# Patient Record
Sex: MALE | Race: WHITE | HISPANIC OR LATINO | URBAN - METROPOLITAN AREA
[De-identification: names, ages, dates, MRNs, and addresses within clinical notes are randomized per-mention and may not be internally consistent; named-entity substitution may affect disease eponyms.]

---

## 2018-03-26 ENCOUNTER — EMERGENCY (EMERGENCY)
Facility: HOSPITAL | Age: 23
LOS: 1 days | Discharge: HOME | End: 2018-03-26
Attending: EMERGENCY MEDICINE
Payer: COMMERCIAL

## 2018-03-26 VITALS
HEIGHT: 73 IN | TEMPERATURE: 98 F | DIASTOLIC BLOOD PRESSURE: 78 MMHG | RESPIRATION RATE: 17 BRPM | OXYGEN SATURATION: 100 % | SYSTOLIC BLOOD PRESSURE: 134 MMHG | WEIGHT: 169.98 LBS | HEART RATE: 77 BPM

## 2018-03-26 VITALS
SYSTOLIC BLOOD PRESSURE: 129 MMHG | DIASTOLIC BLOOD PRESSURE: 77 MMHG | OXYGEN SATURATION: 99 % | RESPIRATION RATE: 18 BRPM | TEMPERATURE: 99 F

## 2018-03-26 DIAGNOSIS — F43.23 ADJUSTMENT DISORDER WITH MIXED ANXIETY AND DEPRESSED MOOD: ICD-10-CM

## 2018-03-26 DIAGNOSIS — R69 ILLNESS, UNSPECIFIED: ICD-10-CM

## 2018-03-26 LAB
ANION GAP SERPL CALC-SCNC: 7 MMOL/L — SIGNIFICANT CHANGE UP (ref 5–17)
APAP SERPL-MCNC: <2 UG/ML — LOW (ref 10–30)
APPEARANCE UR: CLEAR — SIGNIFICANT CHANGE UP
BASOPHILS # BLD AUTO: 0.1 K/UL — SIGNIFICANT CHANGE UP (ref 0–0.2)
BASOPHILS NFR BLD AUTO: 1 % — SIGNIFICANT CHANGE UP (ref 0–2)
BILIRUB UR-MCNC: NEGATIVE — SIGNIFICANT CHANGE UP
BUN SERPL-MCNC: 7 MG/DL — SIGNIFICANT CHANGE UP (ref 7–18)
CALCIUM SERPL-MCNC: 8.8 MG/DL — SIGNIFICANT CHANGE UP (ref 8.4–10.5)
CHLORIDE SERPL-SCNC: 105 MMOL/L — SIGNIFICANT CHANGE UP (ref 96–108)
CO2 SERPL-SCNC: 26 MMOL/L — SIGNIFICANT CHANGE UP (ref 22–31)
COLOR SPEC: YELLOW — SIGNIFICANT CHANGE UP
CREAT SERPL-MCNC: 0.71 MG/DL — SIGNIFICANT CHANGE UP (ref 0.5–1.3)
DIFF PNL FLD: NEGATIVE — SIGNIFICANT CHANGE UP
EOSINOPHIL # BLD AUTO: 0 K/UL — SIGNIFICANT CHANGE UP (ref 0–0.5)
EOSINOPHIL NFR BLD AUTO: 0.5 % — SIGNIFICANT CHANGE UP (ref 0–6)
GLUCOSE SERPL-MCNC: 88 MG/DL — SIGNIFICANT CHANGE UP (ref 70–99)
GLUCOSE UR QL: NEGATIVE — SIGNIFICANT CHANGE UP
HCT VFR BLD CALC: 41 % — SIGNIFICANT CHANGE UP (ref 39–50)
HGB BLD-MCNC: 13.8 G/DL — SIGNIFICANT CHANGE UP (ref 13–17)
KETONES UR-MCNC: ABNORMAL
LEUKOCYTE ESTERASE UR-ACNC: NEGATIVE — SIGNIFICANT CHANGE UP
LYMPHOCYTES # BLD AUTO: 2.3 K/UL — SIGNIFICANT CHANGE UP (ref 1–3.3)
LYMPHOCYTES # BLD AUTO: 43.7 % — SIGNIFICANT CHANGE UP (ref 13–44)
MCHC RBC-ENTMCNC: 30.7 PG — SIGNIFICANT CHANGE UP (ref 27–34)
MCHC RBC-ENTMCNC: 33.6 GM/DL — SIGNIFICANT CHANGE UP (ref 32–36)
MCV RBC AUTO: 91.4 FL — SIGNIFICANT CHANGE UP (ref 80–100)
MONOCYTES # BLD AUTO: 0.3 K/UL — SIGNIFICANT CHANGE UP (ref 0–0.9)
MONOCYTES NFR BLD AUTO: 5.9 % — SIGNIFICANT CHANGE UP (ref 2–14)
NEUTROPHILS # BLD AUTO: 2.6 K/UL — SIGNIFICANT CHANGE UP (ref 1.8–7.4)
NEUTROPHILS NFR BLD AUTO: 48.9 % — SIGNIFICANT CHANGE UP (ref 43–77)
NITRITE UR-MCNC: NEGATIVE — SIGNIFICANT CHANGE UP
PCP SPEC-MCNC: SIGNIFICANT CHANGE UP
PH UR: 8 — SIGNIFICANT CHANGE UP (ref 5–8)
PLATELET # BLD AUTO: 243 K/UL — SIGNIFICANT CHANGE UP (ref 150–400)
POTASSIUM SERPL-MCNC: 3.8 MMOL/L — SIGNIFICANT CHANGE UP (ref 3.5–5.3)
POTASSIUM SERPL-SCNC: 3.8 MMOL/L — SIGNIFICANT CHANGE UP (ref 3.5–5.3)
PROT UR-MCNC: NEGATIVE — SIGNIFICANT CHANGE UP
RBC # BLD: 4.49 M/UL — SIGNIFICANT CHANGE UP (ref 4.2–5.8)
RBC # FLD: 12.2 % — SIGNIFICANT CHANGE UP (ref 10.3–14.5)
SALICYLATES SERPL-MCNC: <1.7 MG/DL — LOW (ref 2.8–20)
SODIUM SERPL-SCNC: 138 MMOL/L — SIGNIFICANT CHANGE UP (ref 135–145)
SP GR SPEC: 1.01 — SIGNIFICANT CHANGE UP (ref 1.01–1.02)
UROBILINOGEN FLD QL: 1
WBC # BLD: 5.4 K/UL — SIGNIFICANT CHANGE UP (ref 3.8–10.5)
WBC # FLD AUTO: 5.4 K/UL — SIGNIFICANT CHANGE UP (ref 3.8–10.5)

## 2018-03-26 PROCEDURE — 93005 ELECTROCARDIOGRAM TRACING: CPT

## 2018-03-26 PROCEDURE — 80048 BASIC METABOLIC PNL TOTAL CA: CPT

## 2018-03-26 PROCEDURE — 80307 DRUG TEST PRSMV CHEM ANLYZR: CPT

## 2018-03-26 PROCEDURE — 85027 COMPLETE CBC AUTOMATED: CPT

## 2018-03-26 PROCEDURE — 99285 EMERGENCY DEPT VISIT HI MDM: CPT

## 2018-03-26 PROCEDURE — 99285 EMERGENCY DEPT VISIT HI MDM: CPT | Mod: 25

## 2018-03-26 PROCEDURE — 81003 URINALYSIS AUTO W/O SCOPE: CPT

## 2018-03-26 PROCEDURE — 90792 PSYCH DIAG EVAL W/MED SRVCS: CPT | Mod: GT

## 2018-03-26 NOTE — ED BEHAVIORAL HEALTH ASSESSMENT NOTE - RISK ASSESSMENT
Patient has no h/o suicide attempts, no self injury, no legal hx, no violence, pt currently not suicidal with no ideation, intent or plan.  Patient reports no AVH.  Patient. would benefit from therapy to learn coping skills for his sad mood and grief.

## 2018-03-26 NOTE — ED ADULT TRIAGE NOTE - CHIEF COMPLAINT QUOTE
States " I feel very depressed due to family problems since 2 weeks" , had suicidal thoughts in the past, but has no plan.

## 2018-03-26 NOTE — ED BEHAVIORAL HEALTH ASSESSMENT NOTE - DESCRIPTION
unremarkable none Patient. 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

## 2018-03-26 NOTE — ED PROVIDER NOTE - MEDICAL DECISION MAKING DETAILS
23 y/o M pt presents with depression s/p breakup with girlfriend. Will consult psychiatry, however do not suspect pt will require inpatient admission given protective factors and fleeting thoughts. Will await psychiatric consultation.

## 2018-03-26 NOTE — ED ADULT NURSE NOTE - OBJECTIVE STATEMENT
C/o since breaking up with girlfriend insomina, poor po intake and fleeting thoughts of hurting self.

## 2018-03-26 NOTE — ED PROVIDER NOTE - CARE PLAN
Principal Discharge DX:	Adjustment disorder with mixed anxiety and depressed mood  Secondary Diagnosis:	Deferred condition on axis II

## 2018-03-26 NOTE — ED PROVIDER NOTE - OBJECTIVE STATEMENT
23 y/o M pt with no PMHx and no PSHx presents to ED c/o depression and no PO intake x48 hours. Pt reports feeling sad and having poor sleep for several weeks following a breakup with his girlfriend. Pt also notes having fleeting thoughts of hurting himself, however those thoughts are not sustained and pt has not taken any actions to hurt himself. Pt's mother is at bedside in ED and confirms pt's sx's/story. Pt denies any other complaints. NKDA.

## 2018-03-26 NOTE — ED BEHAVIORAL HEALTH ASSESSMENT NOTE - SUMMARY
Patient is a 23 y/o  male with no past psych hx  or hospitalizations, with no hx of substance abuse aside from frequent marijuana use, no arrests, no legal hx, no suicide attempts, no self injury bib mom after he told her that he had suicidal thoughts.    Patient reports he broke up with GF a few days ago after a two week separation.  He thought they were just taking a break and he just  found out that she wanted to break up with him.  He was very upset.  They had gone out for 8-9 months after they met online. Patient. said he was not sure why she wanted to break up but said initially they had gotten into a fight.  This was patients  first serious relationship.  Patient quit his job after an angry outburst in January.

## 2018-03-26 NOTE — ED BEHAVIORAL HEALTH ASSESSMENT NOTE - HPI (INCLUDE ILLNESS QUALITY, SEVERITY, DURATION, TIMING, CONTEXT, MODIFYING FACTORS, ASSOCIATED SIGNS AND SYMPTOMS)
Patient is a 23 y/o  male with no past psych hx  or hospitalizations, with no hx of substance abuse aside from frequent marijuana use, no arrests, no legal hx, no suicide attempts, no self injury bib mom after he told her that he had suicidal thoughts.    Patient reports he broke up with GF a few days ago after a two week separation.  He thought they were just taking a break and he just  found out that she wanted to break up with him.  He was very upset.  They had gone out for 8-9 months after they met online. Patient. said he was not sure why she wanted to break up but said initially they had gotten into a fight.  This was patients  first serious relationship.  Patient quit his job after an angry outburst in January.  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  Patient. is sad and feels hopeless at times.  He knows he has to get over this. Patient. reports  he has had suicidal thoughts with no intent or plan.  Patient currently is not suicidal.  Patient feels he would be willing to talk to a therapist.  Patient. denies any suicide attempts in past or self injury.  Patient denies any abuse hx, or trauma hx.  Patient has no medical complaints and is not on any medication. Patient is a 23 y/o  male with no past psych hx  or hospitalizations, with no hx of substance abuse aside from frequent marijuana use, no arrests, no legal hx, no suicide attempts, no self injury bib mom after he told her that he had suicidal thoughts.    Patient reports he broke up with GF a few days ago after a two week separation.  He thought they were just taking a break and he just  found out that she wanted to break up with him.  He was very upset.  They had gone out for 8-9 months after they met online. Patient. said he was not sure why she wanted to break up but said initially they had gotten into a fight.  This was patients  first serious relationship.  Patient quit his job after an angry outburst in January.  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  Patient. is sad and feels hopeless at times.  He knows he has to get over this. Patient. reports  he has had suicidal thoughts with no intent or plan.  Patient currently is not suicidal.  Patient feels he would be willing to talk to a therapist.  Patient. denies any suicide attempts in past or self injury.  Patient denies any abuse hx, or trauma hx.  Patient has no medical complaints and is not on any medication.    Third party collateral: [facility] spoke with Alicia Johnson pts mother who reports pt is a 22 year old male, currently unemployed, non-care taker, single domiciled with his mother, sister and grandparents with no psychiatric hx or past hospitalizations. Pt did see a therapist briefly when he was 6 y/o after parent divorce. Pt has no hx of SA and has been expressing passive SI with no plan or intent for the past few weeks. Pt has no history of violence, legal hx, access to weapons, or hx of trauma. Pt utilizes marijuana daily and mother denies any other drug or alcohol use. Recent stressors are pt leaving his job in January as he “lost his temper” and recent break up with his girlfriend. Pt was BIB by his mother for depression and making passive SI statements. Pts mother reports that over the two weeks pt has been presenting as depressed, eating less (lost weight), having difficulty sleeping, and becoming more agitated. Early Wednesday morning, pt called his mother into his room around 3AM and began crying about his recent break up, an  that an ex gf had without telling him last year, and resentment that he feels towards his father for leaving him for a year in his childhood. Also, pts ex gf texted pts mother and reported pt was making passive SI statements. Pt also made passive statements to mom yesterday. As a result, pts mother brought pt to the ER. Pts mother agrees that pt would benefit from outpatient therapy and feels pt is safe going home.

## 2018-03-27 PROBLEM — Z00.00 ENCOUNTER FOR PREVENTIVE HEALTH EXAMINATION: Status: ACTIVE | Noted: 2018-03-27

## 2021-09-15 ENCOUNTER — FORM ENCOUNTER (OUTPATIENT)
Age: 26
End: 2021-09-15

## 2021-09-15 ENCOUNTER — NON-APPOINTMENT (OUTPATIENT)
Age: 26
End: 2021-09-15

## 2021-09-16 ENCOUNTER — LABORATORY RESULT (OUTPATIENT)
Age: 26
End: 2021-09-16

## 2021-09-16 ENCOUNTER — APPOINTMENT (OUTPATIENT)
Dept: INFECTIOUS DISEASE | Facility: CLINIC | Age: 26
End: 2021-09-16
Payer: COMMERCIAL

## 2021-09-16 VITALS
WEIGHT: 170 LBS | RESPIRATION RATE: 16 BRPM | SYSTOLIC BLOOD PRESSURE: 117 MMHG | HEIGHT: 72 IN | OXYGEN SATURATION: 99 % | HEART RATE: 65 BPM | TEMPERATURE: 98.5 F | DIASTOLIC BLOOD PRESSURE: 74 MMHG | BODY MASS INDEX: 23.03 KG/M2

## 2021-09-16 PROCEDURE — 96372 THER/PROPH/DIAG INJ SC/IM: CPT

## 2021-09-16 PROCEDURE — 99203 OFFICE O/P NEW LOW 30 MIN: CPT

## 2021-09-16 PROCEDURE — 99203 OFFICE O/P NEW LOW 30 MIN: CPT | Mod: 25

## 2021-09-16 RX ORDER — PENICILLIN G BENZATHINE 2400000 [IU]/4ML
2400000 INJECTION, SUSPENSION INTRAMUSCULAR
Qty: 1 | Refills: 0 | Status: COMPLETED | OUTPATIENT
Start: 2021-09-16

## 2021-09-16 RX ADMIN — PENICILLIN G BENZATHINE 0 UNIT/4ML: 2400000 INJECTION, SUSPENSION INTRAMUSCULAR at 00:00
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
## 2021-09-17 LAB
25(OH)D3 SERPL-MCNC: 25.1 NG/ML
ALBUMIN SERPL ELPH-MCNC: 4.9 G/DL
ALP BLD-CCNC: 70 U/L
ALT SERPL-CCNC: 12 U/L
ANION GAP SERPL CALC-SCNC: 11 MMOL/L
APPEARANCE: CLEAR
AST SERPL-CCNC: 18 U/L
BACTERIA: NEGATIVE
BASOPHILS # BLD AUTO: 0.02 K/UL
BASOPHILS NFR BLD AUTO: 0.5 %
BILIRUB SERPL-MCNC: 1 MG/DL
BILIRUBIN URINE: NEGATIVE
BLOOD URINE: NEGATIVE
BUN SERPL-MCNC: 11 MG/DL
C TRACH RRNA SPEC QL NAA+PROBE: NOT DETECTED
CALCIUM SERPL-MCNC: 9.6 MG/DL
CD3 CELLS # BLD: 1595 /UL
CD3 CELLS NFR BLD: 85 %
CD3+CD4+ CELLS # BLD: 425 /UL
CD3+CD4+ CELLS NFR BLD: 23 %
CD3+CD4+ CELLS/CD3+CD8+ CLL SPEC: 0.39 RATIO
CD3+CD8+ CELLS # SPEC: 1077 /UL
CD3+CD8+ CELLS NFR BLD: 58 %
CHLORIDE SERPL-SCNC: 102 MMOL/L
CHOLEST SERPL-MCNC: 123 MG/DL
CO2 SERPL-SCNC: 26 MMOL/L
COLOR: YELLOW
CREAT SERPL-MCNC: 0.94 MG/DL
EOSINOPHIL # BLD AUTO: 0.06 K/UL
EOSINOPHIL NFR BLD AUTO: 1.5 %
GLUCOSE QUALITATIVE U: NEGATIVE
GLUCOSE SERPL-MCNC: 93 MG/DL
HBV SURFACE AB SER QL: REACTIVE
HBV SURFACE AG SER QL: NONREACTIVE
HCT VFR BLD CALC: 44 %
HCV AB SER QL: NONREACTIVE
HCV S/CO RATIO: 0.24 S/CO
HDLC SERPL-MCNC: 38 MG/DL
HEPATITIS A IGG ANTIBODY: REACTIVE
HGB BLD-MCNC: 14 G/DL
HIV1 RNA # SERPL NAA+PROBE: ABNORMAL
HIV1 RNA # SERPL NAA+PROBE: NORMAL
HIV1+2 AB SPEC QL IA.RAPID: REACTIVE
HYALINE CASTS: 0 /LPF
IMM GRANULOCYTES NFR BLD AUTO: 0.2 %
KETONES URINE: NEGATIVE
LDLC SERPL CALC-MCNC: 75 MG/DL
LEUKOCYTE ESTERASE URINE: NEGATIVE
LYMPHOCYTES # BLD AUTO: 2.16 K/UL
LYMPHOCYTES NFR BLD AUTO: 52.4 %
MAN DIFF?: NORMAL
MCHC RBC-ENTMCNC: 30.3 PG
MCHC RBC-ENTMCNC: 31.8 GM/DL
MCV RBC AUTO: 95.2 FL
MICROSCOPIC-UA: NORMAL
MONOCYTES # BLD AUTO: 0.45 K/UL
MONOCYTES NFR BLD AUTO: 10.9 %
N GONORRHOEA RRNA SPEC QL NAA+PROBE: NOT DETECTED
NEUTROPHILS # BLD AUTO: 1.42 K/UL
NEUTROPHILS NFR BLD AUTO: 34.5 %
NITRITE URINE: NEGATIVE
NONHDLC SERPL-MCNC: 85 MG/DL
PH URINE: 7.5
PLATELET # BLD AUTO: 263 K/UL
POTASSIUM SERPL-SCNC: 4.7 MMOL/L
PROT SERPL-MCNC: 8.4 G/DL
PROTEIN URINE: NORMAL
RBC # BLD: 4.62 M/UL
RBC # FLD: 12.9 %
RED BLOOD CELLS URINE: 5 /HPF
SODIUM SERPL-SCNC: 137 MMOL/L
SOURCE AMPLIFICATION: NORMAL
SPECIFIC GRAVITY URINE: 1.03
SQUAMOUS EPITHELIAL CELLS: 2 /HPF
T PALLIDUM AB SER QL IA: POSITIVE
TRIGL SERPL-MCNC: 52 MG/DL
UROBILINOGEN URINE: ABNORMAL
VIRAL LOAD INTERP: NORMAL
VIRAL LOAD LOG: 3.46
WBC # FLD AUTO: 4.12 K/UL
WHITE BLOOD CELLS URINE: 5 /HPF

## 2021-09-21 ENCOUNTER — NON-APPOINTMENT (OUTPATIENT)
Age: 26
End: 2021-09-21

## 2021-09-21 LAB
HBV DNA # SERPL NAA+PROBE: NOT DETECTED IU/ML
HEPB DNA PCR LOG: NOT DETECTED LOG10IU/ML

## 2021-09-22 ENCOUNTER — APPOINTMENT (OUTPATIENT)
Dept: INFECTIOUS DISEASE | Facility: CLINIC | Age: 26
End: 2021-09-22

## 2021-09-23 ENCOUNTER — NON-APPOINTMENT (OUTPATIENT)
Age: 26
End: 2021-09-23

## 2021-09-24 ENCOUNTER — APPOINTMENT (OUTPATIENT)
Dept: INFECTIOUS DISEASE | Facility: CLINIC | Age: 26
End: 2021-09-24
Payer: COMMERCIAL

## 2021-09-24 DIAGNOSIS — A53.9 SYPHILIS, UNSPECIFIED: ICD-10-CM

## 2021-09-24 PROCEDURE — 96372 THER/PROPH/DIAG INJ SC/IM: CPT

## 2021-09-24 RX ORDER — PENICILLIN G BENZATHINE 2400000 [IU]/4ML
2400000 INJECTION, SUSPENSION INTRAMUSCULAR
Qty: 1 | Refills: 0 | Status: COMPLETED | OUTPATIENT
Start: 2021-09-24

## 2021-09-28 LAB
BICTEGRAVIR RESISTANCE: NORMAL
DOLUTEGRAVIR RESISTANCE: NORMAL
ELVITEGRAVIR RESISTANCE: NORMAL
HIV RT GENE MUT DET ISLT: NORMAL
HIV-1 GENOTYPE DRUG RESISTANCE: DETECTED
HIV-1 INTEGRASE GENOTYPE: NORMAL
RALTEGRAVIR RESISTANCE: NORMAL
VIRAL LOAD DATE: NORMAL

## 2021-09-30 ENCOUNTER — NON-APPOINTMENT (OUTPATIENT)
Age: 26
End: 2021-09-30

## 2021-10-01 ENCOUNTER — APPOINTMENT (OUTPATIENT)
Dept: INFECTIOUS DISEASE | Facility: CLINIC | Age: 26
End: 2021-10-01
Payer: COMMERCIAL

## 2021-10-01 DIAGNOSIS — T80.89XA OTHER COMPLICATIONS FOLLOWING INFUSION, TRANSFUSION AND THERAPEUTIC INJECTION, INITIAL ENCOUNTER: ICD-10-CM

## 2021-10-01 DIAGNOSIS — R52 OTHER COMPLICATIONS FOLLOWING INFUSION, TRANSFUSION AND THERAPEUTIC INJECTION, INITIAL ENCOUNTER: ICD-10-CM

## 2021-10-01 PROCEDURE — 96372 THER/PROPH/DIAG INJ SC/IM: CPT

## 2021-10-01 RX ORDER — IBUPROFEN 200 MG/1
200 TABLET ORAL
Qty: 0 | Refills: 0 | Status: COMPLETED | OUTPATIENT
Start: 2021-10-01

## 2021-10-01 RX ORDER — PENICILLIN G BENZATHINE 2400000 [IU]/4ML
2400000 INJECTION, SUSPENSION INTRAMUSCULAR
Qty: 1 | Refills: 0 | Status: COMPLETED | OUTPATIENT
Start: 2021-09-24

## 2021-10-01 RX ADMIN — IBUPROFEN 2 MG: 200 TABLET, FILM COATED ORAL at 00:00

## 2021-10-11 ENCOUNTER — NON-APPOINTMENT (OUTPATIENT)
Age: 26
End: 2021-10-11

## 2021-10-15 ENCOUNTER — RX RENEWAL (OUTPATIENT)
Age: 26
End: 2021-10-15

## 2021-10-18 ENCOUNTER — NON-APPOINTMENT (OUTPATIENT)
Age: 26
End: 2021-10-18

## 2021-10-19 ENCOUNTER — APPOINTMENT (OUTPATIENT)
Dept: INFECTIOUS DISEASE | Facility: CLINIC | Age: 26
End: 2021-10-19

## 2021-10-20 ENCOUNTER — APPOINTMENT (OUTPATIENT)
Dept: INFECTIOUS DISEASE | Facility: CLINIC | Age: 26
End: 2021-10-20
Payer: COMMERCIAL

## 2021-10-20 VITALS
OXYGEN SATURATION: 98 % | HEART RATE: 63 BPM | SYSTOLIC BLOOD PRESSURE: 105 MMHG | TEMPERATURE: 97.5 F | WEIGHT: 170 LBS | BODY MASS INDEX: 23.03 KG/M2 | HEIGHT: 72 IN | DIASTOLIC BLOOD PRESSURE: 74 MMHG

## 2021-10-20 DIAGNOSIS — A54.9 GONOCOCCAL INFECTION, UNSPECIFIED: ICD-10-CM

## 2021-10-20 LAB
ALBUMIN SERPL ELPH-MCNC: 4.6 G/DL
ALP BLD-CCNC: 63 U/L
ALT SERPL-CCNC: 17 U/L
ANION GAP SERPL CALC-SCNC: 10 MMOL/L
AST SERPL-CCNC: 19 U/L
BILIRUB SERPL-MCNC: 0.6 MG/DL
BUN SERPL-MCNC: 12 MG/DL
CALCIUM SERPL-MCNC: 9.5 MG/DL
CD3 CELLS # BLD: 1807 /UL
CD3 CELLS NFR BLD: 85 %
CD3+CD4+ CELLS # BLD: 591 /UL
CD3+CD4+ CELLS NFR BLD: 28 %
CD3+CD4+ CELLS/CD3+CD8+ CLL SPEC: 0.53 RATIO
CD3+CD8+ CELLS # SPEC: 1124 /UL
CD3+CD8+ CELLS NFR BLD: 53 %
CHLORIDE SERPL-SCNC: 104 MMOL/L
CO2 SERPL-SCNC: 27 MMOL/L
CREAT SERPL-MCNC: 0.99 MG/DL
GLUCOSE SERPL-MCNC: 97 MG/DL
POTASSIUM SERPL-SCNC: 4 MMOL/L
PROT SERPL-MCNC: 7.6 G/DL
SODIUM SERPL-SCNC: 140 MMOL/L

## 2021-10-20 PROCEDURE — 99214 OFFICE O/P EST MOD 30 MIN: CPT
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
## 2021-10-21 ENCOUNTER — NON-APPOINTMENT (OUTPATIENT)
Age: 26
End: 2021-10-21

## 2021-10-22 LAB
BASOPHILS # BLD AUTO: 0.02 K/UL
BASOPHILS NFR BLD AUTO: 0.5 %
EOSINOPHIL # BLD AUTO: 0.07 K/UL
EOSINOPHIL NFR BLD AUTO: 1.8 %
HCT VFR BLD CALC: 41.6 %
HGB BLD-MCNC: 13.2 G/DL
HIV1 RNA # SERPL NAA+PROBE: ABNORMAL
HIV1 RNA # SERPL NAA+PROBE: ABNORMAL COPIES/ML
IMM GRANULOCYTES NFR BLD AUTO: 0 %
LYMPHOCYTES # BLD AUTO: 2.05 K/UL
LYMPHOCYTES NFR BLD AUTO: 54.1 %
MAN DIFF?: NORMAL
MCHC RBC-ENTMCNC: 29.9 PG
MCHC RBC-ENTMCNC: 31.7 GM/DL
MCV RBC AUTO: 94.1 FL
MONOCYTES # BLD AUTO: 0.4 K/UL
MONOCYTES NFR BLD AUTO: 10.6 %
NEUTROPHILS # BLD AUTO: 1.25 K/UL
NEUTROPHILS NFR BLD AUTO: 33 %
PLATELET # BLD AUTO: 285 K/UL
RBC # BLD: 4.42 M/UL
RBC # FLD: 13.1 %
VIRAL LOAD INTERP: NORMAL
VIRAL LOAD LOG: ABNORMAL LG COP/ML
WBC # FLD AUTO: 3.79 K/UL

## 2021-11-17 ENCOUNTER — NON-APPOINTMENT (OUTPATIENT)
Age: 26
End: 2021-11-17

## 2021-11-18 ENCOUNTER — APPOINTMENT (OUTPATIENT)
Dept: INFECTIOUS DISEASE | Facility: CLINIC | Age: 26
End: 2021-11-18

## 2021-11-18 ENCOUNTER — NON-APPOINTMENT (OUTPATIENT)
Age: 26
End: 2021-11-18

## 2021-11-19 ENCOUNTER — NON-APPOINTMENT (OUTPATIENT)
Age: 26
End: 2021-11-19

## 2021-12-08 ENCOUNTER — NON-APPOINTMENT (OUTPATIENT)
Age: 26
End: 2021-12-08

## 2021-12-08 ENCOUNTER — FORM ENCOUNTER (OUTPATIENT)
Age: 26
End: 2021-12-08

## 2021-12-09 ENCOUNTER — APPOINTMENT (OUTPATIENT)
Dept: INFECTIOUS DISEASE | Facility: CLINIC | Age: 26
End: 2021-12-09
Payer: COMMERCIAL

## 2021-12-09 VITALS
TEMPERATURE: 97.8 F | OXYGEN SATURATION: 99 % | DIASTOLIC BLOOD PRESSURE: 59 MMHG | SYSTOLIC BLOOD PRESSURE: 104 MMHG | HEART RATE: 69 BPM | HEIGHT: 72 IN | WEIGHT: 168 LBS | BODY MASS INDEX: 22.75 KG/M2

## 2021-12-09 PROCEDURE — 99213 OFFICE O/P EST LOW 20 MIN: CPT
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
## 2021-12-10 LAB
25(OH)D3 SERPL-MCNC: 13.8 NG/ML
ALBUMIN SERPL ELPH-MCNC: 5 G/DL
ALP BLD-CCNC: 71 U/L
ALT SERPL-CCNC: 12 U/L
ANION GAP SERPL CALC-SCNC: 9 MMOL/L
APPEARANCE: CLEAR
AST SERPL-CCNC: 14 U/L
BACTERIA: NEGATIVE
BASOPHILS # BLD AUTO: 0.02 K/UL
BASOPHILS NFR BLD AUTO: 0.5 %
BILIRUB SERPL-MCNC: 2.1 MG/DL
BILIRUBIN URINE: NEGATIVE
BLOOD URINE: NEGATIVE
BUN SERPL-MCNC: 10 MG/DL
C TRACH RRNA SPEC QL NAA+PROBE: NOT DETECTED
CALCIUM SERPL-MCNC: 9.8 MG/DL
CD3 CELLS # BLD: 1728 /UL
CD3 CELLS NFR BLD: 81 %
CD3+CD4+ CELLS # BLD: 625 /UL
CD3+CD4+ CELLS NFR BLD: 29 %
CD3+CD4+ CELLS/CD3+CD8+ CLL SPEC: 0.61 RATIO
CD3+CD8+ CELLS # SPEC: 1029 /UL
CD3+CD8+ CELLS NFR BLD: 48 %
CHLORIDE SERPL-SCNC: 102 MMOL/L
CO2 SERPL-SCNC: 28 MMOL/L
COLOR: YELLOW
CREAT SERPL-MCNC: 1.07 MG/DL
EOSINOPHIL # BLD AUTO: 0.08 K/UL
EOSINOPHIL NFR BLD AUTO: 2 %
GLUCOSE QUALITATIVE U: NEGATIVE
GLUCOSE SERPL-MCNC: 98 MG/DL
HCT VFR BLD CALC: 40.2 %
HGB BLD-MCNC: 13 G/DL
HIV1 RNA # SERPL NAA+PROBE: NORMAL
HIV1 RNA # SERPL NAA+PROBE: NORMAL COPIES/ML
HYALINE CASTS: 2 /LPF
IMM GRANULOCYTES NFR BLD AUTO: 0.3 %
KETONES URINE: NEGATIVE
LEUKOCYTE ESTERASE URINE: NEGATIVE
LYMPHOCYTES # BLD AUTO: 2.07 K/UL
LYMPHOCYTES NFR BLD AUTO: 52.5 %
MAN DIFF?: NORMAL
MCHC RBC-ENTMCNC: 31.4 PG
MCHC RBC-ENTMCNC: 32.3 GM/DL
MCV RBC AUTO: 97.1 FL
MICROSCOPIC-UA: NORMAL
MONOCYTES # BLD AUTO: 0.44 K/UL
MONOCYTES NFR BLD AUTO: 11.2 %
N GONORRHOEA RRNA SPEC QL NAA+PROBE: NOT DETECTED
NEUTROPHILS # BLD AUTO: 1.32 K/UL
NEUTROPHILS NFR BLD AUTO: 33.5 %
NITRITE URINE: NEGATIVE
PH URINE: 6
PLATELET # BLD AUTO: 303 K/UL
POTASSIUM SERPL-SCNC: 3.9 MMOL/L
PROT SERPL-MCNC: 7.9 G/DL
PROTEIN URINE: ABNORMAL
RBC # BLD: 4.14 M/UL
RBC # FLD: 13.2 %
RED BLOOD CELLS URINE: 1 /HPF
SODIUM SERPL-SCNC: 139 MMOL/L
SOURCE AMPLIFICATION: NORMAL
SPECIFIC GRAVITY URINE: 1.03
SQUAMOUS EPITHELIAL CELLS: 1 /HPF
UROBILINOGEN URINE: NORMAL
VIRAL LOAD INTERP: NORMAL
VIRAL LOAD LOG: NORMAL LG COP/ML
WBC # FLD AUTO: 3.94 K/UL
WHITE BLOOD CELLS URINE: 8 /HPF

## 2021-12-16 ENCOUNTER — NON-APPOINTMENT (OUTPATIENT)
Age: 26
End: 2021-12-16

## 2022-01-05 ENCOUNTER — INPATIENT (INPATIENT)
Facility: HOSPITAL | Age: 27
LOS: 1 days | Discharge: HOME | End: 2022-01-07
Attending: STUDENT IN AN ORGANIZED HEALTH CARE EDUCATION/TRAINING PROGRAM | Admitting: STUDENT IN AN ORGANIZED HEALTH CARE EDUCATION/TRAINING PROGRAM
Payer: COMMERCIAL

## 2022-01-05 VITALS
RESPIRATION RATE: 18 BRPM | SYSTOLIC BLOOD PRESSURE: 151 MMHG | TEMPERATURE: 99 F | OXYGEN SATURATION: 98 % | HEIGHT: 73 IN | HEART RATE: 150 BPM | DIASTOLIC BLOOD PRESSURE: 86 MMHG

## 2022-01-05 LAB
HCT VFR BLD CALC: 43 % — SIGNIFICANT CHANGE UP (ref 39–50)
HGB BLD-MCNC: 14.9 G/DL — SIGNIFICANT CHANGE UP (ref 13–17)
IANC: 7.44 K/UL — SIGNIFICANT CHANGE UP (ref 1.5–8.5)
MCHC RBC-ENTMCNC: 31.4 PG — SIGNIFICANT CHANGE UP (ref 27–34)
MCHC RBC-ENTMCNC: 34.7 GM/DL — SIGNIFICANT CHANGE UP (ref 32–36)
MCV RBC AUTO: 90.7 FL — SIGNIFICANT CHANGE UP (ref 80–100)
PLATELET # BLD AUTO: 279 K/UL — SIGNIFICANT CHANGE UP (ref 150–400)
RBC # BLD: 4.74 M/UL — SIGNIFICANT CHANGE UP (ref 4.2–5.8)
RBC # FLD: 11.9 % — SIGNIFICANT CHANGE UP (ref 10.3–14.5)
WBC # BLD: 9.61 K/UL — SIGNIFICANT CHANGE UP (ref 3.8–10.5)
WBC # FLD AUTO: 9.61 K/UL — SIGNIFICANT CHANGE UP (ref 3.8–10.5)

## 2022-01-05 PROCEDURE — 93010 ELECTROCARDIOGRAM REPORT: CPT

## 2022-01-05 PROCEDURE — 71045 X-RAY EXAM CHEST 1 VIEW: CPT | Mod: 26

## 2022-01-05 PROCEDURE — 99285 EMERGENCY DEPT VISIT HI MDM: CPT | Mod: 25

## 2022-01-05 RX ORDER — SODIUM CHLORIDE 9 MG/ML
1000 INJECTION INTRAMUSCULAR; INTRAVENOUS; SUBCUTANEOUS ONCE
Refills: 0 | Status: COMPLETED | OUTPATIENT
Start: 2022-01-05 | End: 2022-01-05

## 2022-01-05 RX ORDER — FAMOTIDINE 10 MG/ML
20 INJECTION INTRAVENOUS ONCE
Refills: 0 | Status: COMPLETED | OUTPATIENT
Start: 2022-01-05 | End: 2022-01-05

## 2022-01-05 RX ORDER — ONDANSETRON 8 MG/1
4 TABLET, FILM COATED ORAL ONCE
Refills: 0 | Status: COMPLETED | OUTPATIENT
Start: 2022-01-05 | End: 2022-01-05

## 2022-01-05 RX ORDER — SODIUM CHLORIDE 9 MG/ML
2000 INJECTION INTRAMUSCULAR; INTRAVENOUS; SUBCUTANEOUS ONCE
Refills: 0 | Status: COMPLETED | OUTPATIENT
Start: 2022-01-05 | End: 2022-01-05

## 2022-01-05 RX ORDER — ACETAMINOPHEN 500 MG
650 TABLET ORAL ONCE
Refills: 0 | Status: COMPLETED | OUTPATIENT
Start: 2022-01-05 | End: 2022-01-05

## 2022-01-05 RX ADMIN — Medication 650 MILLIGRAM(S): at 23:58

## 2022-01-05 RX ADMIN — SODIUM CHLORIDE 1000 MILLILITER(S): 9 INJECTION INTRAMUSCULAR; INTRAVENOUS; SUBCUTANEOUS at 23:58

## 2022-01-05 RX ADMIN — SODIUM CHLORIDE 2000 MILLILITER(S): 9 INJECTION INTRAMUSCULAR; INTRAVENOUS; SUBCUTANEOUS at 22:17

## 2022-01-05 RX ADMIN — ONDANSETRON 4 MILLIGRAM(S): 8 TABLET, FILM COATED ORAL at 22:17

## 2022-01-05 RX ADMIN — FAMOTIDINE 20 MILLIGRAM(S): 10 INJECTION INTRAVENOUS at 22:17

## 2022-01-05 NOTE — ED PROVIDER NOTE - ATTENDING CONTRIBUTION TO CARE
Patient is a 27 yo M with history of HIV on Biktavy, undetectable VL, does not know last CD4 but states it was good, now here for nausea, vomiting and diarrhea since yesterday. His sister had COVID in the past week. He is not vaccinated. He states he feels short of breath, denies chest pain.     VS noted  Gen. no acute distress, Non toxic   HEENT: EOMI, mmm  Lungs: CTAB/L no C/ W /R   CVS: tachycardic  Abd; Soft non tender, non distended   Ext: no edema  Skin: no rash  Neuro AAOx3 non focal clear speech  a/p: n/v/d - concern for COVID19. Plan for ekg, IVF, labs, CXR, reassess.   - Matthew WALTER

## 2022-01-05 NOTE — ED PROVIDER NOTE - NS ED ROS FT
CONST: no fevers, no chills, no lightheadedness + generalized weakness   HEENT: no vision change, no sore throat  CV: no chest pain, no palpitations  RESP: no cough, no shortness of breath  ABD: + abdominal pain, + nausea/vomiting, + diarrhea  : no dysuria, no hematuria  ENDO: no frequent urination, no unusual thirst  MSK: generalized myalgias   NEURO: no headache, no focal weakness, no loss of sensation  SKIN:  no rash

## 2022-01-05 NOTE — ED PROVIDER NOTE - OBJECTIVE STATEMENT
26M HIV (states last viral load undetectable, "my last labs were all good) on Biktarvy p/w diarrhea, vomiting, malaise. Reports 2 days of persistant n/v/d (NBNB) w/o ability to tolerate po, denies cough or fevers. Has not tested for covid. States sister with similar flu-like symptoms at home. No neck pain.  + palpitations. Unvaccinated.     Noted tachy to 130+ in waiting room.

## 2022-01-05 NOTE — ED PROVIDER NOTE - CLINICAL SUMMARY MEDICAL DECISION MAKING FREE TEXT BOX
26M hx HIV on biktarvy w/ several days GI symptoms, unable to tolerate po. sinus tach, uncomfortable. Plan for labs, fluids, cxr, antiemetics, reassess.

## 2022-01-05 NOTE — ED PROVIDER NOTE - PHYSICAL EXAMINATION
Gen: WDWN, NAD, uncomfortable  HEENT: EOMI, no nasal discharge, mucous membranes moist  CV: sinus tach, +S1/S2, no M/R/G  Resp: CTAB, no W/R/R  GI: Abdomen mild diffuse discomfort w/o rebound/guarding, no masses  MSK: No open wounds, no bruising, no LE edema  Neuro: A&Ox4, following commands, moving all four extremities spontaneously  Psych: appropriate mood, denies AH, VH, SI

## 2022-01-05 NOTE — ED PROVIDER NOTE - PROGRESS NOTE DETAILS
Jose PGY3: covid/flu neg. Suspect likely gastroenteritis. bands of 37%, concern for bacterial infectious etiology. still tachycardiac after 3L, pt endorsing persistent diarrhea. will admit at this time having drawn blood cx, will order stool studies, plan for admission. Ordered abx.

## 2022-01-05 NOTE — ED ADULT NURSE NOTE - OBJECTIVE STATEMENT
Pt. presented to room 5. Pt. A&Ox4 ambulatory self. Pt. c/o diarrhea, nausea and vomit ting x2 days states he has not been able to keep anything down and always going to BR.  Pt. believes he is covid + however has not previously tested positive. Pt. is not vaccinated. Pt. denies SOB, belly pain, lightheadedness and/or dizziness.  Pt. sinus tachycardia on the monitor now 117. Pt. states he does have some CP when he takes a deep breath./ Pt. presented to room 5. Pt. A&Ox4 ambulatory self. Pt. c/o diarrhea, nausea and vomit ting x2 days states he has not been able to keep anything down and always going to BR.  Pt. believes he is covid + however has not previously tested positive. Pt. is not vaccinated. Pt. denies SOB, belly pain, lightheadedness and/or dizziness.  Pt. sinus tachycardia on the monitor now 117. Vitally stable on RA. Pt. states he does have some CP when he takes a deep breath. [id number] labs sent meds per order.

## 2022-01-06 ENCOUNTER — NON-APPOINTMENT (OUTPATIENT)
Age: 27
End: 2022-01-06

## 2022-01-06 DIAGNOSIS — R11.2 NAUSEA WITH VOMITING, UNSPECIFIED: ICD-10-CM

## 2022-01-06 DIAGNOSIS — Z29.9 ENCOUNTER FOR PROPHYLACTIC MEASURES, UNSPECIFIED: ICD-10-CM

## 2022-01-06 DIAGNOSIS — B20 HUMAN IMMUNODEFICIENCY VIRUS [HIV] DISEASE: ICD-10-CM

## 2022-01-06 DIAGNOSIS — K52.9 NONINFECTIVE GASTROENTERITIS AND COLITIS, UNSPECIFIED: ICD-10-CM

## 2022-01-06 DIAGNOSIS — E87.1 HYPO-OSMOLALITY AND HYPONATREMIA: ICD-10-CM

## 2022-01-06 LAB
ALBUMIN SERPL ELPH-MCNC: 3.7 G/DL — SIGNIFICANT CHANGE UP (ref 3.3–5)
ALBUMIN SERPL ELPH-MCNC: 4.5 G/DL — SIGNIFICANT CHANGE UP (ref 3.3–5)
ALP SERPL-CCNC: 50 U/L — SIGNIFICANT CHANGE UP (ref 40–120)
ALP SERPL-CCNC: 69 U/L — SIGNIFICANT CHANGE UP (ref 40–120)
ALT FLD-CCNC: 16 U/L — SIGNIFICANT CHANGE UP (ref 4–41)
ALT FLD-CCNC: 26 U/L — SIGNIFICANT CHANGE UP (ref 4–41)
ANION GAP SERPL CALC-SCNC: 12 MMOL/L — SIGNIFICANT CHANGE UP (ref 7–14)
ANION GAP SERPL CALC-SCNC: 14 MMOL/L — SIGNIFICANT CHANGE UP (ref 7–14)
AST SERPL-CCNC: 14 U/L — SIGNIFICANT CHANGE UP (ref 4–40)
AST SERPL-CCNC: 19 U/L — SIGNIFICANT CHANGE UP (ref 4–40)
BASOPHILS # BLD AUTO: 0 K/UL — SIGNIFICANT CHANGE UP (ref 0–0.2)
BASOPHILS # BLD AUTO: 0 K/UL — SIGNIFICANT CHANGE UP (ref 0–0.2)
BASOPHILS NFR BLD AUTO: 0 % — SIGNIFICANT CHANGE UP (ref 0–2)
BASOPHILS NFR BLD AUTO: 0 % — SIGNIFICANT CHANGE UP (ref 0–2)
BILIRUB SERPL-MCNC: 1.6 MG/DL — HIGH (ref 0.2–1.2)
BILIRUB SERPL-MCNC: 2.3 MG/DL — HIGH (ref 0.2–1.2)
BLOOD GAS VENOUS COMPREHENSIVE RESULT: SIGNIFICANT CHANGE UP
BUN SERPL-MCNC: 14 MG/DL — SIGNIFICANT CHANGE UP (ref 7–23)
BUN SERPL-MCNC: 8 MG/DL — SIGNIFICANT CHANGE UP (ref 7–23)
CALCIUM SERPL-MCNC: 8.8 MG/DL — SIGNIFICANT CHANGE UP (ref 8.4–10.5)
CALCIUM SERPL-MCNC: 9.6 MG/DL — SIGNIFICANT CHANGE UP (ref 8.4–10.5)
CHLORIDE SERPL-SCNC: 91 MMOL/L — LOW (ref 98–107)
CHLORIDE SERPL-SCNC: 97 MMOL/L — LOW (ref 98–107)
CO2 SERPL-SCNC: 22 MMOL/L — SIGNIFICANT CHANGE UP (ref 22–31)
CO2 SERPL-SCNC: 24 MMOL/L — SIGNIFICANT CHANGE UP (ref 22–31)
CREAT SERPL-MCNC: 0.99 MG/DL — SIGNIFICANT CHANGE UP (ref 0.5–1.3)
CREAT SERPL-MCNC: 1.25 MG/DL — SIGNIFICANT CHANGE UP (ref 0.5–1.3)
CULTURE RESULTS: SIGNIFICANT CHANGE UP
EOSINOPHIL # BLD AUTO: 0 K/UL — SIGNIFICANT CHANGE UP (ref 0–0.5)
EOSINOPHIL # BLD AUTO: 0 K/UL — SIGNIFICANT CHANGE UP (ref 0–0.5)
EOSINOPHIL NFR BLD AUTO: 0 % — SIGNIFICANT CHANGE UP (ref 0–6)
EOSINOPHIL NFR BLD AUTO: 0 % — SIGNIFICANT CHANGE UP (ref 0–6)
FLUAV AG NPH QL: SIGNIFICANT CHANGE UP
FLUBV AG NPH QL: SIGNIFICANT CHANGE UP
GLUCOSE SERPL-MCNC: 120 MG/DL — HIGH (ref 70–99)
GLUCOSE SERPL-MCNC: 125 MG/DL — HIGH (ref 70–99)
HCT VFR BLD CALC: 38.5 % — LOW (ref 39–50)
HGB BLD-MCNC: 13.2 G/DL — SIGNIFICANT CHANGE UP (ref 13–17)
IANC: 6.08 K/UL — SIGNIFICANT CHANGE UP (ref 1.5–8.5)
LIDOCAIN IGE QN: 11 U/L — SIGNIFICANT CHANGE UP (ref 7–60)
LYMPHOCYTES # BLD AUTO: 0.52 K/UL — LOW (ref 1–3.3)
LYMPHOCYTES # BLD AUTO: 0.8 K/UL — LOW (ref 1–3.3)
LYMPHOCYTES # BLD AUTO: 5.4 % — LOW (ref 13–44)
LYMPHOCYTES # BLD AUTO: 9.6 % — LOW (ref 13–44)
MAGNESIUM SERPL-MCNC: 1.5 MG/DL — LOW (ref 1.6–2.6)
MAGNESIUM SERPL-MCNC: 1.6 MG/DL — SIGNIFICANT CHANGE UP (ref 1.6–2.6)
MCHC RBC-ENTMCNC: 32.3 PG — SIGNIFICANT CHANGE UP (ref 27–34)
MCHC RBC-ENTMCNC: 34.3 GM/DL — SIGNIFICANT CHANGE UP (ref 32–36)
MCV RBC AUTO: 94.1 FL — SIGNIFICANT CHANGE UP (ref 80–100)
MONOCYTES # BLD AUTO: 0.68 K/UL — SIGNIFICANT CHANGE UP (ref 0–0.9)
MONOCYTES # BLD AUTO: 1.1 K/UL — HIGH (ref 0–0.9)
MONOCYTES NFR BLD AUTO: 13.2 % — SIGNIFICANT CHANGE UP (ref 2–14)
MONOCYTES NFR BLD AUTO: 7.1 % — SIGNIFICANT CHANGE UP (ref 2–14)
NEUTROPHILS # BLD AUTO: 6.12 K/UL — SIGNIFICANT CHANGE UP (ref 1.8–7.4)
NEUTROPHILS # BLD AUTO: 7.03 K/UL — SIGNIFICANT CHANGE UP (ref 1.8–7.4)
NEUTROPHILS NFR BLD AUTO: 35.7 % — LOW (ref 43–77)
NEUTROPHILS NFR BLD AUTO: 50 % — SIGNIFICANT CHANGE UP (ref 43–77)
PHOSPHATE SERPL-MCNC: 1.1 MG/DL — LOW (ref 2.5–4.5)
PHOSPHATE SERPL-MCNC: 1.9 MG/DL — LOW (ref 2.5–4.5)
PLATELET # BLD AUTO: 236 K/UL — SIGNIFICANT CHANGE UP (ref 150–400)
POTASSIUM SERPL-MCNC: 3.4 MMOL/L — LOW (ref 3.5–5.3)
POTASSIUM SERPL-MCNC: 3.5 MMOL/L — SIGNIFICANT CHANGE UP (ref 3.5–5.3)
POTASSIUM SERPL-SCNC: 3.4 MMOL/L — LOW (ref 3.5–5.3)
POTASSIUM SERPL-SCNC: 3.5 MMOL/L — SIGNIFICANT CHANGE UP (ref 3.5–5.3)
PROT SERPL-MCNC: 6.9 G/DL — SIGNIFICANT CHANGE UP (ref 6–8.3)
PROT SERPL-MCNC: 8 G/DL — SIGNIFICANT CHANGE UP (ref 6–8.3)
RBC # BLD: 4.09 M/UL — LOW (ref 4.2–5.8)
RBC # FLD: 11.9 % — SIGNIFICANT CHANGE UP (ref 10.3–14.5)
RSV RNA NPH QL NAA+NON-PROBE: SIGNIFICANT CHANGE UP
SARS-COV-2 RNA SPEC QL NAA+PROBE: SIGNIFICANT CHANGE UP
SODIUM SERPL-SCNC: 129 MMOL/L — LOW (ref 135–145)
SODIUM SERPL-SCNC: 131 MMOL/L — LOW (ref 135–145)
SPECIMEN SOURCE: SIGNIFICANT CHANGE UP
WBC # BLD: 8.3 K/UL — SIGNIFICANT CHANGE UP (ref 3.8–10.5)
WBC # FLD AUTO: 8.3 K/UL — SIGNIFICANT CHANGE UP (ref 3.8–10.5)

## 2022-01-06 PROCEDURE — 12345: CPT | Mod: NC,GC

## 2022-01-06 PROCEDURE — 99223 1ST HOSP IP/OBS HIGH 75: CPT

## 2022-01-06 RX ORDER — ENOXAPARIN SODIUM 100 MG/ML
40 INJECTION SUBCUTANEOUS DAILY
Refills: 0 | Status: DISCONTINUED | OUTPATIENT
Start: 2022-01-06 | End: 2022-01-07

## 2022-01-06 RX ORDER — CEFTRIAXONE 500 MG/1
1000 INJECTION, POWDER, FOR SOLUTION INTRAMUSCULAR; INTRAVENOUS EVERY 24 HOURS
Refills: 0 | Status: DISCONTINUED | OUTPATIENT
Start: 2022-01-07 | End: 2022-01-07

## 2022-01-06 RX ORDER — POTASSIUM PHOSPHATE, MONOBASIC POTASSIUM PHOSPHATE, DIBASIC 236; 224 MG/ML; MG/ML
30 INJECTION, SOLUTION INTRAVENOUS ONCE
Refills: 0 | Status: COMPLETED | OUTPATIENT
Start: 2022-01-06 | End: 2022-01-06

## 2022-01-06 RX ORDER — SODIUM CHLORIDE 9 MG/ML
1000 INJECTION, SOLUTION INTRAVENOUS
Refills: 0 | Status: DISCONTINUED | OUTPATIENT
Start: 2022-01-06 | End: 2022-01-07

## 2022-01-06 RX ORDER — LANOLIN ALCOHOL/MO/W.PET/CERES
3 CREAM (GRAM) TOPICAL AT BEDTIME
Refills: 0 | Status: DISCONTINUED | OUTPATIENT
Start: 2022-01-06 | End: 2022-01-07

## 2022-01-06 RX ORDER — MAGNESIUM SULFATE 500 MG/ML
2 VIAL (ML) INJECTION ONCE
Refills: 0 | Status: COMPLETED | OUTPATIENT
Start: 2022-01-06 | End: 2022-01-06

## 2022-01-06 RX ORDER — PIPERACILLIN AND TAZOBACTAM 4; .5 G/20ML; G/20ML
3.38 INJECTION, POWDER, LYOPHILIZED, FOR SOLUTION INTRAVENOUS ONCE
Refills: 0 | Status: COMPLETED | OUTPATIENT
Start: 2022-01-06 | End: 2022-01-06

## 2022-01-06 RX ORDER — CEFTRIAXONE 500 MG/1
1000 INJECTION, POWDER, FOR SOLUTION INTRAMUSCULAR; INTRAVENOUS ONCE
Refills: 0 | Status: COMPLETED | OUTPATIENT
Start: 2022-01-06 | End: 2022-01-06

## 2022-01-06 RX ORDER — CEFTRIAXONE 500 MG/1
INJECTION, POWDER, FOR SOLUTION INTRAMUSCULAR; INTRAVENOUS
Refills: 0 | Status: DISCONTINUED | OUTPATIENT
Start: 2022-01-06 | End: 2022-01-07

## 2022-01-06 RX ORDER — BICTEGRAVIR SODIUM, EMTRICITABINE, AND TENOFOVIR ALAFENAMIDE FUMARATE 30; 120; 15 MG/1; MG/1; MG/1
1 TABLET ORAL
Qty: 0 | Refills: 0 | DISCHARGE

## 2022-01-06 RX ORDER — BICTEGRAVIR SODIUM, EMTRICITABINE, AND TENOFOVIR ALAFENAMIDE FUMARATE 30; 120; 15 MG/1; MG/1; MG/1
1 TABLET ORAL DAILY
Refills: 0 | Status: DISCONTINUED | OUTPATIENT
Start: 2022-01-06 | End: 2022-01-07

## 2022-01-06 RX ORDER — POTASSIUM CHLORIDE 20 MEQ
10 PACKET (EA) ORAL
Refills: 0 | Status: COMPLETED | OUTPATIENT
Start: 2022-01-06 | End: 2022-01-06

## 2022-01-06 RX ADMIN — CEFTRIAXONE 100 MILLIGRAM(S): 500 INJECTION, POWDER, FOR SOLUTION INTRAMUSCULAR; INTRAVENOUS at 19:05

## 2022-01-06 RX ADMIN — Medication 25 GRAM(S): at 09:00

## 2022-01-06 RX ADMIN — POTASSIUM PHOSPHATE, MONOBASIC POTASSIUM PHOSPHATE, DIBASIC 83.33 MILLIMOLE(S): 236; 224 INJECTION, SOLUTION INTRAVENOUS at 12:57

## 2022-01-06 RX ADMIN — Medication 100 MILLIEQUIVALENT(S): at 12:58

## 2022-01-06 RX ADMIN — Medication 100 MILLIEQUIVALENT(S): at 11:57

## 2022-01-06 RX ADMIN — Medication 3 MILLIGRAM(S): at 22:43

## 2022-01-06 RX ADMIN — BICTEGRAVIR SODIUM, EMTRICITABINE, AND TENOFOVIR ALAFENAMIDE FUMARATE 1 TABLET(S): 30; 120; 15 TABLET ORAL at 12:57

## 2022-01-06 RX ADMIN — PIPERACILLIN AND TAZOBACTAM 200 GRAM(S): 4; .5 INJECTION, POWDER, LYOPHILIZED, FOR SOLUTION INTRAVENOUS at 02:03

## 2022-01-06 RX ADMIN — Medication 30 MILLILITER(S): at 14:55

## 2022-01-06 RX ADMIN — Medication 100 MILLIEQUIVALENT(S): at 14:55

## 2022-01-06 RX ADMIN — Medication 63.75 MILLIMOLE(S): at 06:46

## 2022-01-06 RX ADMIN — SODIUM CHLORIDE 150 MILLILITER(S): 9 INJECTION, SOLUTION INTRAVENOUS at 09:00

## 2022-01-06 RX ADMIN — SODIUM CHLORIDE 150 MILLILITER(S): 9 INJECTION, SOLUTION INTRAVENOUS at 05:01

## 2022-01-06 NOTE — H&P ADULT - NSHPREVIEWOFSYSTEMS_GEN_ALL_CORE
Review of Systems:   CONSTITUTIONAL: resolved rigors,  fatigue  EYES: No eye pain, visual disturbances, or discharge  ENMT:  No difficulty hearing, tinnitus, vertigo; No sinus or throat pain  NECK: No pain or stiffness  RESPIRATORY: No cough, wheezing, chills or hemoptysis; No shortness of breath  CARDIOVASCULAR: No chest pain, palpitations, dizziness, or leg swelling  GASTROINTESTINAL: Crampy abdominal pain, nausea. + diarrhea, no melena or hematochezia.  GENITOURINARY: No dysuria, frequency, hematuria, or incontinence  NEUROLOGICAL: No headaches, memory loss, loss of strength, numbness, or tremors  SKIN: No itching, burning, rashes, or lesions   MUSCULOSKELETAL: No joint pain or swelling; No muscle, back, or extremity pain

## 2022-01-06 NOTE — H&P ADULT - HISTORY OF PRESENT ILLNESS
25 yo h/o HIV on Biktarvy (reports compliance) CD4 count Dec 9th 625 upon review of outpt labs. Reports sudden onset of diffuse crampy abdominal pain upon waking up 2 days ago, associated  with multiple episodes of non-blood diarrhea and nausea. Notes vomiting only after attempted po intake. Pt denies recent travel. Ate pizza night before symptoms onset which was shared with household member  who he reports is asymptomatic. Denies sexual activity within last few months. Initially sinus tach in 150s, now in low 100s after 3 L IVF

## 2022-01-06 NOTE — H&P ADULT - NSHPPHYSICALEXAM_GEN_ALL_CORE
PHYSICAL EXAM:      Constitutional: NAD, well-groomed, well-developed  HEENT: EOMI, Normal Hearing  Neck: No LAD, No JVD  Back: Normal spine flexure, No CVA tenderness  Respiratory: CTAB  Cardiovascular: S1 and S2, RRR, no M/G/R  Gastrointestinal: BS+, soft, NT/ND  Extremities: No peripheral edema  Vascular: 2+ peripheral pulses  Neurological: A/O x 3, no focal deficits  Psychiatric: Normal mood, normal affect  Musculoskeletal: 5/5 strength b/l upper and lower extremities  Skin: No rashes PHYSICAL EXAM:      Constitutional: NAD, well-groomed, well-developed  HEENT: EOMI, Normal Hearing  Neck: No LAD, No JVD  Back: Normal spine flexure, No CVA tenderness  Respiratory: CTAB  Cardiovascular: S1 and S2, RRR  Gastrointestinal: BS+, soft, mild diffuse tenderness  Extremities: No peripheral edema  Vascular: 2+ peripheral pulses  Neurological: A/O x 3, no focal deficits  Psychiatric: Normal mood, normal affect  Musculoskeletal: 5/5 strength b/l upper and lower extremities  Skin: No rashes

## 2022-01-06 NOTE — PROGRESS NOTE ADULT - PROBLEM SELECTOR PLAN 1
- ?2/2 hyperemesis cannabinoid syndrome   - pt reports frequent marijuana use, last at onset of sxs  - pt reports diet consisting of predominantly fast food, had been eating pizza prior to illness  - denies sick contacts, changes in diet, activities that could explain sxs  - c/w IVF  - GI PCR, ova/ parasites, c diff ordered  - lipase 11  - s/p zosyn in ed, hold further Abx for now  - diet as tolerated  - Maalox PRN  - Streaks of BRB in stool 1/6

## 2022-01-06 NOTE — H&P ADULT - NSHPLABSRESULTS_GEN_ALL_CORE
14.9   9.61  )-----------( 279      ( 05 Jan 2022 22:45 )             43.0     01-05    129<L>  |  91<L>  |  14  ----------------------------<  125<H>  3.5   |  24  |  1.25    Ca    9.6      05 Jan 2022 22:45  Phos  1.9     01-05  Mg     1.50     01-05    TPro  8.0  /  Alb  4.5  /  TBili  2.3<H>  /  DBili  x   /  AST  19  /  ALT  26  /  AlkPhos  69  01-05    CAPILLARY BLOOD GLUCOSE            Vital Signs Last 24 Hrs  T(C): 37.3 (06 Jan 2022 04:41), Max: 37.4 (05 Jan 2022 20:12)  T(F): 99.2 (06 Jan 2022 04:41), Max: 99.4 (05 Jan 2022 20:12)  HR: 108 (06 Jan 2022 04:41) (90 - 150)  BP: 128/71 (06 Jan 2022 04:41) (112/81 - 151/86)  BP(mean): --  RR: 17 (06 Jan 2022 04:41) (17 - 18)  SpO2: 97% (06 Jan 2022 04:41) (97% - 99%)

## 2022-01-06 NOTE — PROGRESS NOTE ADULT - ATTENDING COMMENTS
Patient seen and examined. Case discussed with the medical team on rounds. I agree with the findings and the plan above.    Patient reports that he went to the bathroom 30 times with diarrhea, he has also vomited and is requesting something for pain. Otherwise not engaged with the exam. Upon further questioning the patient states that he does smoke marijuana every other day.     Patient could be having hyperemesis cannabinoid syndrome   Pending GI PCR   will also send utox  Continue the rest of the work up and management as stated above.

## 2022-01-06 NOTE — H&P ADULT - PROBLEM SELECTOR PROBLEM 3
Gastroenterology Consult Note      Patient: Christos Shelton  : 1946  Acct#:      Date:  2020    Subjective:       History of Present Illness  Patient is a 69 y.o.  male admitted with Acute respiratory failure with hypoxia ([address] [J96.01]  Acute respiratory failure with hypoxia ([address] [J96.01] who is seen in consult for cirrhosis and ascites. H/o cirrhosis due to FLOYD and is followed by Dr Madonna Eden. H/o HE and has been prescribed lactulose and xifaxan. H/o esophageal variceal bleed in the past.   Large varices banded 7/15/2019. Repeat egd rec 6 months (overdue). H/o ascites with prior paracentesis a year ago. On lasix 40 mg daily, aldactone 100 mg daily. Came to the ED for worsening abdominal distention and SOB. He was found to have right pleural effusion and underwent thoracentesis. Today he underwent paracentesis with 5.3 L fluid removed. Also was in afib with rapid rate. He denies abdominal pain, N/V, melena, hematochezia. His wife keeps him on a low salt diet.      Past Medical History:   Diagnosis Date    Acute on chronic diastolic congestive heart failure (HCC)     ANEMIA     CAD (coronary artery disease) 10/12/2011    Chronic obstructive pulmonary disease (HCC)     Cirrhosis (HCC)     Depression     Diabetes mellitus ([address]     Esophageal bleed, non-variceal 2012    Hypertension     HYPOTHYROIDISM     Liver disease 2012    Mixed hyperlipidemia     NEUROPATHY     Non-ST elevation MI (NSTEMI) ([address] 2012    Pancytopenia ([address]     Paroxysmal atrial fibrillation ([address] 2012    Pneumonia     Sleep apnea     no cpap    Thrombocytopenia ([address]     Thyroid disease       Past Surgical History:   Procedure Laterality Date    ACHILLES TENDON SURGERY      CARDIAC SURGERY      3 vessel cabg    CHOLECYSTECTOMY      COLONOSCOPY      CORONARY ARTERY BYPASS GRAFT  10/2011    cabg x3    ENDOSCOPY, COLON, DIAGNOSTIC      PTCA  2012    PTCA RCA & RPL    TONSILLECTOMY      UPPER GASTROINTESTINAL ENDOSCOPY  11/23/2012    multiple thin linear esophageal mucosal breaks    UPPER GASTROINTESTINAL ENDOSCOPY  3/6/2014    UPPER GASTROINTESTINAL ENDOSCOPY N/A 6/7/2019    EGD BAND LIGATION performed by Nathan Dixon MD at [facility] 7/15/2019    EGD BIOPSY performed by Nathan Dixon MD at [facility] N/A 7/15/2019    EGD BAND LIGATION performed by Nathan Dixon MD at [address] [place] [address]      Past Endoscopic History: see hpi    Admission Meds  No current facility-administered medications on file prior to encounter.       Current Outpatient Medications on File Prior to Encounter   Medication Sig Dispense Refill    ONGLYZA 5 MG TABS tablet       azithromycin (ZITHROMAX) 250 MG tablet 2 day one then one daily till gone ( Z Daniel) 1 packet 0    citalopram (CELEXA) 20 MG tablet TAKE ONE TABLET BY MOUTH DAILY 90 tablet 2    glipiZIDE (GLUCOTROL) 5 MG tablet TAKE ONE TABLET BY MOUTH TWICE A DAY BEFORE MEALS 180 tablet 2    levothyroxine (SYNTHROID) 150 MCG tablet TAKE ONE TABLET BY MOUTH DAILY (Patient taking differently: Take 137 mcg by mouth Daily ) 90 tablet 2    cyanocobalamin 1000 MCG/ML injection INJECT 1 ML INTRAMUSCULARLY ONCE EVERY 30 DAYS 10 mL 1    furosemide (LASIX) 20 MG tablet Take 2 tablets by mouth daily 180 tablet 3    B-D 3CC LUER-ALEXIS SYR 25GX1\" 25G X 1\" 3 ML MISC USE WITH VITAMIN B-12 INJECTIONS 12 each 2    SITagliptin (JANUVIA) 100 MG tablet Take 1 tablet by mouth daily 90 tablet 3    lactulose (CHRONULAC) 10 GM/15ML solution Take 30 mLs by mouth 3 times daily 5 mL 0    nadolol (CORGARD) 20 MG tablet Take 1 tablet by mouth daily 90 tablet 3    spironolactone (ALDACTONE) 50 MG tablet Take 2 tablets by mouth daily 60 tablet 5    CINNAMON PO Take 1,000 mg by mouth daily      b complex vitamins capsule Take 1 capsule by mouth daily      MILK THISTLE PO Take 1,000 mg by mouth daily       XIFAXAN 550 MG tablet 2 times daily       Omega-3 Fatty Acids (FISH OIL) 500 MG CAPS Take 1,000 mg by mouth daily       aspirin 81 MG tablet Take 81 mg by mouth daily. Allergies  Allergies   Allergen Reactions    Cephalexin Hives    Ciprocinonide [Fluocinolone]     Quinolones Other (See Comments)     Confusion      Statins Other (See Comments)     Liver toxicity      Social   Social History     Tobacco Use    Smoking status: Former Smoker     Packs/day: 0.45     Years: 43.00     Pack years: 19.35     Types: Cigarettes     Start date: 1965     Last attempt to quit: 3/1/2020     Years since quittin.5    Smokeless tobacco: Never Used   Substance Use Topics    Alcohol use: No     Comment: RARELY;  approx once every 6 months        Family History   Problem Relation Age of Onset    Heart Disease Mother         arrythmia    Diabetes Father     Cancer Father     Heart Disease Father     Anesth Problems Neg Hx     Malig Hyperten Neg Hx     Hypotension Neg Hx     Malig Hypertherm Neg Hx     Pseudochol. Deficiency Neg Hx          Review of Systems  Constitutional: negative for fevers, chills, sweats    Ears, nose, mouth, throat, and face: negative for nasal congestion and sore throat   Respiratory: negative for cough and shortness of breath   Cardiovascular: negative for chest pain and dyspnea   Gastrointestinal: see hpi   Genitourinary:negative for dysuria and frequency   Integument/breast: negative for pruritus and rash   Hematologic/lymphatic: negative for bleeding and easy bruising   Musculoskeletal:negative for arthralgias and myalgias   Neurological: negative for dizziness and weakness         Physical Exam  Blood pressure 99/65, pulse 105, temperature 97.7 °F (36.5 °C), temperature source Oral, resp. rate 14, height 5' 11\" (1.803 m), weight 204 lb 6.4 oz (92.7 kg), SpO2 91 %.     General appearance: pale, alert, cooperative, no s/p banding 2019. No GI bleeding. H/o ascites on diuretics. Recommendations:   - f/u ascitic fluid cell count, cx, albumin, protein, cytology  - Check liver US with doppler  - will need to resume lasix and aldactone at some point  - daily weights  - 2 gram low sodium diet   - continue home lactulose and xifaxan    Discussed with Dr. Luis Rush, PADelC  [address]    I have personally performed a face to face diagnostic evaluation on this patient. I have interviewed and examined the patient and I agree with the findings and recommended plan of care. In summary, my findings and plan are the followin/M with known FLOYD cirrhosis c/b EV bleeding, HE and ascites. His last EGD with banding was in 2019. He was supposed to have repeat EGD in 6 months (overdue, as patient was trying to self qurantine due to Covid epidemic). He is on lactulose and Xifaxan for the HE. He takes lasix 40 mg and spironolactone 100 mg daily for his ascites. He presents with worsening LE edema and SOB. He was being treated for presumed bacterial bronchitis and was treated by his PCP with antibiotics recently. On work up here, he was found to have heart failure, R sided pleural effusion and ascites. S/p thoracentesis and paracentesis yesterday. VSS abdomen obese soft and nontender, + Ascites   I explained that patient should be getting <2 grams sodium a day. Wife tells me they actually close to 3 grams Na a day, as patient likes his food a little salty (ie hot dogs). He is on aldactone 100 mg daily and lasix currently being given via IV, as he is getting diuresed. The IV lasix will need to be changed back to oral lasix 40 mg daily later on. He may need intermittent paracentesis to help with his ascites. Schedule OP EGD with Dr. Brian Boateng for EV surveillance. Agree with RUQ US with doppler to makes sure there is no PVT.     Stanton Mcintyre MD, MSc  [address] HIV disease

## 2022-01-06 NOTE — PATIENT PROFILE ADULT - FALL HARM RISK - UNIVERSAL INTERVENTIONS
Bed in lowest position, wheels locked, appropriate side rails in place/Call bell, personal items and telephone in reach/Instruct patient to call for assistance before getting out of bed or chair/Non-slip footwear when patient is out of bed/[place] to call system/Physically safe environment - no spills, clutter or unnecessary equipment/Purposeful Proactive Rounding/Room/bathroom lighting operational, light cord in reach

## 2022-01-06 NOTE — H&P ADULT - PROBLEM SELECTOR PLAN 1
-c/w IVF  -gi pcr, ova/ parasites, cdiff ordered  -given zosyn in ed, hold further abx for now  -diet as tolerated

## 2022-01-07 ENCOUNTER — TRANSCRIPTION ENCOUNTER (OUTPATIENT)
Age: 27
End: 2022-01-07

## 2022-01-07 VITALS
SYSTOLIC BLOOD PRESSURE: 141 MMHG | OXYGEN SATURATION: 98 % | TEMPERATURE: 99 F | HEART RATE: 83 BPM | DIASTOLIC BLOOD PRESSURE: 84 MMHG | RESPIRATION RATE: 17 BRPM

## 2022-01-07 LAB
4/8 RATIO: 0.61 RATIO — LOW (ref 0.9–3.6)
ABS CD8: 653 /UL — SIGNIFICANT CHANGE UP (ref 142–740)
ALBUMIN SERPL ELPH-MCNC: 3.6 G/DL — SIGNIFICANT CHANGE UP (ref 3.3–5)
ALP SERPL-CCNC: 51 U/L — SIGNIFICANT CHANGE UP (ref 40–120)
ALT FLD-CCNC: 20 U/L — SIGNIFICANT CHANGE UP (ref 4–41)
ANION GAP SERPL CALC-SCNC: 11 MMOL/L — SIGNIFICANT CHANGE UP (ref 7–14)
AST SERPL-CCNC: 21 U/L — SIGNIFICANT CHANGE UP (ref 4–40)
BILIRUB SERPL-MCNC: 1.1 MG/DL — SIGNIFICANT CHANGE UP (ref 0.2–1.2)
BUN SERPL-MCNC: 6 MG/DL — LOW (ref 7–23)
C DIFF BY PCR RESULT: SIGNIFICANT CHANGE UP
C DIFF TOX GENS STL QL NAA+PROBE: SIGNIFICANT CHANGE UP
CALCIUM SERPL-MCNC: 8.8 MG/DL — SIGNIFICANT CHANGE UP (ref 8.4–10.5)
CD16+CD56+ CELLS NFR BLD: 3 % — LOW (ref 5–23)
CD16+CD56+ CELLS NFR SPEC: 51 /UL — LOW (ref 71–410)
CD19 BLASTS SPEC-ACNC: 17 % — SIGNIFICANT CHANGE UP (ref 6–24)
CD19 BLASTS SPEC-ACNC: 259 /UL — SIGNIFICANT CHANGE UP (ref 84–469)
CD3 BLASTS SPEC-ACNC: 1138 /UL — SIGNIFICANT CHANGE UP (ref 672–1870)
CD3 BLASTS SPEC-ACNC: 77 % — SIGNIFICANT CHANGE UP (ref 59–83)
CD4 %: 27 % — LOW (ref 30–62)
CD8 %: 45 % — HIGH (ref 12–36)
CHLORIDE SERPL-SCNC: 99 MMOL/L — SIGNIFICANT CHANGE UP (ref 98–107)
CO2 SERPL-SCNC: 25 MMOL/L — SIGNIFICANT CHANGE UP (ref 22–31)
CREAT SERPL-MCNC: 0.84 MG/DL — SIGNIFICANT CHANGE UP (ref 0.5–1.3)
CULTURE RESULTS: NO GROWTH — SIGNIFICANT CHANGE UP
CULTURE RESULTS: SIGNIFICANT CHANGE UP
GLUCOSE SERPL-MCNC: 116 MG/DL — HIGH (ref 70–99)
HCT VFR BLD CALC: 36.4 % — LOW (ref 39–50)
HGB BLD-MCNC: 12.6 G/DL — LOW (ref 13–17)
HIV-1 VIRAL LOAD RESULT: SIGNIFICANT CHANGE UP
HIV1 RNA # SERPL NAA+PROBE: SIGNIFICANT CHANGE UP COPIES/ML
HIV1 RNA SER-IMP: SIGNIFICANT CHANGE UP
HIV1 RNA SERPL NAA+PROBE-ACNC: SIGNIFICANT CHANGE UP
HIV1 RNA SERPL NAA+PROBE-LOG#: SIGNIFICANT CHANGE UP LG COP/ML
MAGNESIUM SERPL-MCNC: 2.2 MG/DL — SIGNIFICANT CHANGE UP (ref 1.6–2.6)
MCHC RBC-ENTMCNC: 31.4 PG — SIGNIFICANT CHANGE UP (ref 27–34)
MCHC RBC-ENTMCNC: 34.6 GM/DL — SIGNIFICANT CHANGE UP (ref 32–36)
MCV RBC AUTO: 90.8 FL — SIGNIFICANT CHANGE UP (ref 80–100)
NRBC # BLD: 0 /100 WBCS — SIGNIFICANT CHANGE UP
NRBC # FLD: 0 K/UL — SIGNIFICANT CHANGE UP
PHOSPHATE SERPL-MCNC: 1.9 MG/DL — LOW (ref 2.5–4.5)
PLATELET # BLD AUTO: 233 K/UL — SIGNIFICANT CHANGE UP (ref 150–400)
POTASSIUM SERPL-MCNC: 3.2 MMOL/L — LOW (ref 3.5–5.3)
POTASSIUM SERPL-SCNC: 3.2 MMOL/L — LOW (ref 3.5–5.3)
PROT SERPL-MCNC: 6.4 G/DL — SIGNIFICANT CHANGE UP (ref 6–8.3)
RBC # BLD: 4.01 M/UL — LOW (ref 4.2–5.8)
RBC # FLD: 12.1 % — SIGNIFICANT CHANGE UP (ref 10.3–14.5)
SODIUM SERPL-SCNC: 135 MMOL/L — SIGNIFICANT CHANGE UP (ref 135–145)
SPECIMEN SOURCE: SIGNIFICANT CHANGE UP
SPECIMEN SOURCE: SIGNIFICANT CHANGE UP
T-CELL CD4 SUBSET PNL BLD: 395 /UL — LOW (ref 489–1457)
WBC # BLD: 8.66 K/UL — SIGNIFICANT CHANGE UP (ref 3.8–10.5)
WBC # FLD AUTO: 8.66 K/UL — SIGNIFICANT CHANGE UP (ref 3.8–10.5)

## 2022-01-07 PROCEDURE — 99239 HOSP IP/OBS DSCHRG MGMT >30: CPT | Mod: GC

## 2022-01-07 RX ORDER — POTASSIUM PHOSPHATE, MONOBASIC POTASSIUM PHOSPHATE, DIBASIC 236; 224 MG/ML; MG/ML
30 INJECTION, SOLUTION INTRAVENOUS ONCE
Refills: 0 | Status: COMPLETED | OUTPATIENT
Start: 2022-01-07 | End: 2022-01-07

## 2022-01-07 RX ORDER — CIPROFLOXACIN LACTATE 400MG/40ML
1 VIAL (ML) INTRAVENOUS
Qty: 6 | Refills: 0
Start: 2022-01-07 | End: 2022-01-09

## 2022-01-07 RX ORDER — ONDANSETRON 8 MG/1
4 TABLET, FILM COATED ORAL EVERY 8 HOURS
Refills: 0 | Status: DISCONTINUED | OUTPATIENT
Start: 2022-01-07 | End: 2022-01-07

## 2022-01-07 RX ORDER — POTASSIUM CHLORIDE 20 MEQ
10 PACKET (EA) ORAL
Refills: 0 | Status: COMPLETED | OUTPATIENT
Start: 2022-01-07 | End: 2022-01-07

## 2022-01-07 RX ORDER — CIPROFLOXACIN LACTATE 400MG/40ML
1 VIAL (ML) INTRAVENOUS
Qty: 12 | Refills: 0
Start: 2022-01-07 | End: 2022-01-12

## 2022-01-07 RX ADMIN — CEFTRIAXONE 100 MILLIGRAM(S): 500 INJECTION, POWDER, FOR SOLUTION INTRAMUSCULAR; INTRAVENOUS at 20:32

## 2022-01-07 RX ADMIN — Medication 100 MILLIEQUIVALENT(S): at 15:05

## 2022-01-07 RX ADMIN — Medication 100 MILLIEQUIVALENT(S): at 12:42

## 2022-01-07 RX ADMIN — Medication 100 MILLIEQUIVALENT(S): at 14:07

## 2022-01-07 RX ADMIN — POTASSIUM PHOSPHATE, MONOBASIC POTASSIUM PHOSPHATE, DIBASIC 83.33 MILLIMOLE(S): 236; 224 INJECTION, SOLUTION INTRAVENOUS at 13:51

## 2022-01-07 RX ADMIN — BICTEGRAVIR SODIUM, EMTRICITABINE, AND TENOFOVIR ALAFENAMIDE FUMARATE 1 TABLET(S): 30; 120; 15 TABLET ORAL at 13:51

## 2022-01-07 NOTE — DISCHARGE NOTE NURSING/CASE MANAGEMENT/SOCIAL WORK - NSDCPEFALRISK_GEN_ALL_CORE
For information on Fall & Injury Prevention, visit: [email or web address] OR  [email or web address] OR  https://www.cdc.gov/steadi/patient.html

## 2022-01-07 NOTE — DISCHARGE NOTE PROVIDER - NSDCMRMEDTOKEN_GEN_ALL_CORE_FT
Biktarvy oral tablet: 1 tab(s) orally once a day  ciprofloxacin 500 mg oral tablet: 1 tab(s) orally 2 times a day

## 2022-01-07 NOTE — PROGRESS NOTE ADULT - PROBLEM SELECTOR PLAN 1
- ?2/2 hyperemesis cannabinoid syndrome   - pt reports frequent marijuana use, last at onset of sxs  - pt reports diet consisting of predominantly fast food, had been eating pizza prior to illness  - denies sick contacts, changes in diet, activities that could explain sxs  - c/w IVF  - GI PCR, ova/ parasites, c diff ordered  - lipase 11  - s/p zosyn in ed, hold further Abx for now  - diet as tolerated  - Maalox PRN  - Streaks of BRB in stool 1/6 - ?2/2 hyperemesis cannabinoid syndrome   - pt reports frequent marijuana use, last at onset of sxs  - pt reports diet consisting of predominantly fast food, had been eating pizza prior to illness  - denies sick contacts, changes in diet, activities that could explain sxs  - c/w IVF  - GI PCR, ova/ parasites, c diff ordered  - lipase 11  - s/p zosyn in ed, hold further Abx for now  - diet as tolerated  - Maalox PRN  - Streaks of BRB in stool 1/6  - Stool PCR + Shigella/EIEC, pt started on CTX for total of 5 day course (today is day 2/5)  - d/c pt on cipro while sensitivities pending

## 2022-01-07 NOTE — DISCHARGE NOTE PROVIDER - NSFOLLOWUPCLINICS_GEN_ALL_ED_FT
[facility] [place]  Internal Medicine  [address] [place] [address]  Phone: [phone]  Fax: [phone]

## 2022-01-07 NOTE — PROGRESS NOTE ADULT - PROBLEM SELECTOR PLAN 3
- c/w Biktarvy  - CD4, viral load pending  - CD4 12/9 - 625 - c/w Biktarvy  - viral load undetectable  - CD4 12/9 - 625

## 2022-01-07 NOTE — DISCHARGE NOTE NURSING/CASE MANAGEMENT/SOCIAL WORK - PATIENT PORTAL LINK FT
You can access the FollowMyHealth Patient Portal offered by [facility] by registering at the following website: [email or web address]. By joining [facility]’s FollowMyHealth portal, you will also be able to view your health information using other applications (apps) compatible with our system.

## 2022-01-07 NOTE — DISCHARGE NOTE PROVIDER - NSDCCPCAREPLAN_GEN_ALL_CORE_FT
PRINCIPAL DISCHARGE DIAGNOSIS  Diagnosis: Diarrhea  Assessment and Plan of Treatment: You were admitted for diarrhea in the setting of diffuse abdominal pain and nausea/vomiting. We started you on a liquid diet and sent stool studies out that showed you had Shigella. You were started on antibiotics, and your symptoms began to improve. We later advanced your diet to see how you tolerated solid foods, which you did. We are discharging you on an oral antibiotic called ciprofloxacin while we wait for the sensitivities of your Shigella test to come back. Please continue to take this through 7/12; we may change your antibiotic regimen later depending on the results of the sensitivities.  Please go to the ED if you have any worsening bloody diarrhea, dizziness, loss of consciousness, worsening nausea or vomiting, fevers or chills, chest pain, shortness of breath, or redness at the site of your IV.      SECONDARY DISCHARGE DIAGNOSES  Diagnosis: Diarrhea  Assessment and Plan of Treatment:      PRINCIPAL DISCHARGE DIAGNOSIS  Diagnosis: Diarrhea  Assessment and Plan of Treatment: You were admitted for diarrhea in the setting of diffuse abdominal pain and nausea/vomiting. We started you on a liquid diet and sent stool studies out that showed you had Shigella. You were started on antibiotics, and your symptoms began to improve. We later advanced your diet to see how you tolerated solid foods, which you did. We are discharging you on an oral antibiotic called ciprofloxacin while we wait for the sensitivities of your Shigella test to come back. Please continue to take this through 7/10; we may change your antibiotic regimen later depending on the results of the sensitivities. Please follow up with your PCP within 2 weeks of discharge.  .  Please go to the ED if you have any worsening bloody diarrhea, dizziness, loss of consciousness, worsening nausea or vomiting, fevers or chills, chest pain, shortness of breath, or redness at the site of your IV.      SECONDARY DISCHARGE DIAGNOSES  Diagnosis: Diarrhea  Assessment and Plan of Treatment:      PRINCIPAL DISCHARGE DIAGNOSIS  Diagnosis: Diarrhea  Assessment and Plan of Treatment: You were admitted for diarrhea in the setting of diffuse abdominal pain and nausea/vomiting. We started you on a liquid diet and sent stool studies out that showed you had Shigella. You were started on antibiotics, and your symptoms began to improve. We later advanced your diet to see how you tolerated solid foods, which you did. We are discharging you on an oral antibiotic called ciprofloxacin while we wait for the sensitivities of your Shigella test to come back. Please start taking ciprofloxacin on 1/8/22 and continue to take this through 1/10/22; we may change your antibiotic regimen later depending on the results of the sensitivities. Please follow up with your PCP within 2 weeks of discharge.  .  Please go to the ED if you have any worsening bloody diarrhea, dizziness, loss of consciousness, worsening nausea or vomiting, fevers or chills, chest pain, shortness of breath, or redness at the site of your IV.      SECONDARY DISCHARGE DIAGNOSES  Diagnosis: Diarrhea  Assessment and Plan of Treatment:

## 2022-01-07 NOTE — DISCHARGE NOTE PROVIDER - CARE PROVIDER_API CALL
PUSHPA CRUZ  Internal Medicine  [address] [place] [address]  Phone: [phone]  Fax: [phone]  Established Patient  Follow Up Time: 2 weeks

## 2022-01-07 NOTE — PROGRESS NOTE ADULT - ATTENDING COMMENTS
Patient seen and examined. Case discussed with the medical team on rounds. I agree with the findings and the plan above.      Feels better, GI PCR +for EIEC/shigella  Lab unsure if it will grow, will discharge on cipro. Will continue to follow sensitivities to ensure adequate coverage with cipro.  Patient is stable for discharge, will follow up though after discharge as stated above for lab sensitivities.  If escalation in antibiotics required we will call the patient to inform him.  Continue the rest of the work up and management as stated above.

## 2022-01-07 NOTE — DISCHARGE NOTE PROVIDER - HOSPITAL COURSE
27 yo M PMH HIV on Biktarvy (CD4 count 12/9/21 625, viral load not detected during current admission) presenting with 2 days of diffuse crampy abdominal pain associated with multiple episodes non-bloody diarrhea and nausea. Patient put on clear liquid diet. Later developed bloody diarrhea while admitted. Stool studies came back positive for Shigella, CTX was started while sensitivities pending. Patient sxs improved and is now hemodynamically stable and medically optimized for discharge home. 27 yo M PMH HIV on Biktarvy (CD4 count 12/9/21 625, viral load not detected during current admission) presenting with 2 days of diffuse crampy abdominal pain associated with multiple episodes non-bloody diarrhea and nausea. Patient put on clear liquid diet. Later developed bloody diarrhea while admitted. Stool studies came back positive for Shigella, CTX was started while sensitivities pending. Patient sxs improved and is now hemodynamically stable and medically optimized for discharge home with antibiotics and referrals to appropriate clinics.

## 2022-01-07 NOTE — PROGRESS NOTE ADULT - PROBLEM SELECTOR PLAN 4
Diet: Clear liquids  DVT PPx: Lovenox  Dispo: Diet: Clear Advance as tolerated  DVT PPx: Lovenox  Dispo: Home

## 2022-01-07 NOTE — PROGRESS NOTE ADULT - ASSESSMENT
27 yo m with HIV presenting with suspected gastroenteritis. 25 yo m with HIV presenting with gastroenteritis 2/2 shigella/EIEIC.

## 2022-01-07 NOTE — PROGRESS NOTE ADULT - SUBJECTIVE AND OBJECTIVE BOX
Internal Medicine   [facility] | PGY-1    OVERNIGHT EVENTS: LIBBY      SUBJECTIVE: Patient was seen and examined at bedside this morning. Reports N/V, dark brown diarrhea, and diffuse abdominal pain. Also reports chills and some CP with breathing. Denies any fevers, SOB, cough. Patient responding appropriately to questions and able to make needs known.       MEDICATIONS  (STANDING):  bictegravir 50 mG/emtricitabine 200 mG/tenofovir alafenamide 25 mG (BIKTARVY) 1 Tablet(s) Oral daily  enoxaparin Injectable 40 milliGRAM(s) SubCutaneous daily  lactated ringers. 1000 milliLiter(s) (150 mL/Hr) IV Continuous <Continuous>  potassium chloride  10 mEq/100 mL IVPB 10 milliEquivalent(s) IV Intermittent every 1 hour    MEDICATIONS  (PRN):  aluminum hydroxide/magnesium hydroxide/simethicone Suspension 30 milliLiter(s) Oral every 4 hours PRN Dyspepsia        T(F): 99.2 (01-06-22 @ 04:41), Max: 99.4 (01-05-22 @ 20:12)  HR: 108 (01-06-22 @ 04:41) (90 - 150)  BP: 128/71 (01-06-22 @ 04:41) (112/81 - 151/86)  BP(mean): --  RR: 17 (01-06-22 @ 04:41) (17 - 18)  SpO2: 97% (01-06-22 @ 04:41) (97% - 99%)    PHYSICAL EXAM:     GENERAL: Lying in bed uncomfortably.  HEAD:  Atraumatic, normocephalic.  CHEST/LUNG: CTAB. No rales, rhonchi, wheezing, or rubs. Unlabored respirations.  HEART: Tachycardic, no M/R/G, normal S1/S2.  ABDOMEN: Soft, diffusely tender, nondistended. Hyperactive bowel sounds.  EXTREMITIES:  2+ peripheral pulses b/l. No clubbing, cyanosis, or edema.  NEURO:  AAOx3.   SKIN: No rashes or lesions.  PSYCH: Normal mood, affect.        LABS:                        13.2   8.30  )-----------( 236      ( 06 Jan 2022 07:56 )             38.5     01-06    131<L>  |  97<L>  |  8   ----------------------------<  120<H>  3.4<L>   |  22  |  0.99    Ca    8.8      06 Jan 2022 07:56  Phos  1.1     01-06  Mg     1.60     01-06    TPro  6.9  /  Alb  3.7  /  TBili  1.6<H>  /  DBili  x   /  AST  14  /  ALT  16  /  AlkPhos  50  01-06            Creatinine Trend: 0.99<--, 1.25<--  I&O's Summary    BNP    RADIOLOGY & ADDITIONAL STUDIES:            
Internal Medicine   Kathryn Germán | PGY-1    OVERNIGHT EVENTS: Stool studies +Shigella, patient started on CTX. BCx NGTD.      SUBJECTIVE: Patient was seen and examined at bedside this morning. Still complains of abdominal pain, states it is perhaps improved. Reports continued diarrhea but states it is less frequent. Denies nausea/vomiting, fevers/chills, headache, shortness of breath, or chest pain. Patient responding appropriately to questions and able to make needs known.       MEDICATIONS  (STANDING):  bictegravir 50 mG/emtricitabine 200 mG/tenofovir alafenamide 25 mG (BIKTARVY) 1 Tablet(s) Oral daily  cefTRIAXone   IVPB      cefTRIAXone   IVPB 1000 milliGRAM(s) IV Intermittent every 24 hours  enoxaparin Injectable 40 milliGRAM(s) SubCutaneous daily  lactated ringers. 1000 milliLiter(s) (150 mL/Hr) IV Continuous <Continuous>  potassium chloride  10 mEq/100 mL IVPB 10 milliEquivalent(s) IV Intermittent every 1 hour  potassium phosphate IVPB 30 milliMole(s) IV Intermittent once    MEDICATIONS  (PRN):  aluminum hydroxide/magnesium hydroxide/simethicone Suspension 30 milliLiter(s) Oral every 4 hours PRN Dyspepsia  melatonin 3 milliGRAM(s) Oral at bedtime PRN Insomnia  ondansetron    Tablet 4 milliGRAM(s) Oral every 8 hours PRN Nausea and/or Vomiting        T(F): 98.1 (01-07-22 @ 06:57), Max: 98.6 (01-06-22 @ 22:49)  HR: 87 (01-07-22 @ 06:57) (87 - 100)  BP: 116/71 (01-07-22 @ 06:57) (116/71 - 142/79)  BP(mean): --  RR: 17 (01-07-22 @ 06:57) (17 - 17)  SpO2: 98% (01-07-22 @ 06:57) (98% - 98%)    PHYSICAL EXAM:     GENERAL: NAD, laying on side, somnolent   HEAD:  Atraumatic, normocephalic.  CHEST/LUNG: CTAB. No rales, rhonchi, wheezing, or rubs. Unlabored respirations.  HEART: RRR, no M/R/G, normal S1/S2.  ABDOMEN: Normoactive bowel sounds. Remaining abdominal exam deferred d/t pain.  EXTREMITIES:  2+ peripheral pulses b/l, brisk capillary refill. No clubbing, cyanosis, or edema.  SKIN: No rashes or lesions.  PSYCH: Normal mood, affect.      LABS:                        12.6   8.66  )-----------( 233      ( 07 Jan 2022 07:11 )             36.4     01-07    135  |  99  |  6<L>  ----------------------------<  116<H>  3.2<L>   |  25  |  0.84    Ca    8.8      07 Jan 2022 07:11  Phos  1.9     01-07  Mg     2.20     01-07    TPro  6.4  /  Alb  3.6  /  TBili  1.1  /  DBili  x   /  AST  21  /  ALT  20  /  AlkPhos  51  01-07      GI PCR Panel, Stool (01.06.22 @ 11:40)   Specimen Source: .Stool c&s keshawn   Culture Results:   Shigella/Enteroinvasive E. coli (EIEC)   DETECTED by PCR       Creatinine Trend: 0.84<--, 0.99<--, 1.25<--  I&O's Summary    06 Jan 2022 07:01  -  07 Jan 2022 07:00  --------------------------------------------------------  IN: 1037 mL / OUT: 650 mL / NET: 387 mL

## 2022-01-07 NOTE — PROGRESS NOTE ADULT - PROBLEM SELECTOR PLAN 2
- likely stemming from hypovolemia  - received 3 L IVF, repeat Na 131
- likely stemming from hypovolemia  - received 3 L IVF, repeat Na 131

## 2022-01-08 LAB
-  AMPICILLIN: SIGNIFICANT CHANGE UP
-  CIPROFLOXACIN: SIGNIFICANT CHANGE UP
-  TRIMETHOPRIM/SULFAMETHOXAZOLE: SIGNIFICANT CHANGE UP
CULTURE RESULTS: SIGNIFICANT CHANGE UP
METHOD TYPE: SIGNIFICANT CHANGE UP
ORGANISM # SPEC MICROSCOPIC CNT: SIGNIFICANT CHANGE UP
SPECIMEN SOURCE: SIGNIFICANT CHANGE UP

## 2022-01-08 RX ORDER — CIPROFLOXACIN LACTATE 400MG/40ML
1 VIAL (ML) INTRAVENOUS
Qty: 6 | Refills: 0
Start: 2022-01-08 | End: 2022-01-10

## 2022-01-11 LAB
CULTURE RESULTS: SIGNIFICANT CHANGE UP
CULTURE RESULTS: SIGNIFICANT CHANGE UP
SPECIMEN SOURCE: SIGNIFICANT CHANGE UP
SPECIMEN SOURCE: SIGNIFICANT CHANGE UP

## 2022-01-25 ENCOUNTER — NON-APPOINTMENT (OUTPATIENT)
Age: 27
End: 2022-01-25

## 2022-02-01 ENCOUNTER — RX RENEWAL (OUTPATIENT)
Age: 27
End: 2022-02-01

## 2022-03-09 ENCOUNTER — NON-APPOINTMENT (OUTPATIENT)
Age: 27
End: 2022-03-09

## 2022-04-06 ENCOUNTER — NON-APPOINTMENT (OUTPATIENT)
Age: 27
End: 2022-04-06

## 2022-04-07 ENCOUNTER — NON-APPOINTMENT (OUTPATIENT)
Age: 27
End: 2022-04-07

## 2022-04-07 ENCOUNTER — APPOINTMENT (OUTPATIENT)
Dept: INFECTIOUS DISEASE | Facility: CLINIC | Age: 27
End: 2022-04-07

## 2022-04-07 PROBLEM — B20 HUMAN IMMUNODEFICIENCY VIRUS [HIV] DISEASE: Chronic | Status: ACTIVE | Noted: 2022-01-06

## 2022-04-08 ENCOUNTER — NON-APPOINTMENT (OUTPATIENT)
Age: 27
End: 2022-04-08

## 2022-04-11 ENCOUNTER — NON-APPOINTMENT (OUTPATIENT)
Age: 27
End: 2022-04-11

## 2022-04-29 NOTE — PATIENT PROFILE ADULT - DOES PATIENT HAVE ADVANCE DIRECTIVE
EMERGENCY DEPARTMENT HISTORY AND PHYSICAL EXAM    Date: 4/29/2022  Patient Name: Rip Casillas    History of Presenting Illness     Chief Complaint   Patient presents with    Abdominal Pain       History Provided By: Patient     History Lia Pierre):   12:59 PM  Rip Casillas is a 33 y.o. active [employer] female with no contributory PMHX who presents to the emergency department C/O abdominal pain onset yesterday a \"knotting\"sensation lower abdomen. Associated sxs include waxing waning pain, worse with walking. Pt denies nausea, vomiting, diarrhea or any other sxs or complaints. Chief Complaint: Abdominal pain  Onset: Yesterday  Timing:  Acute  Context: Symptoms started spontaneously, symptoms have waxed and waned since onset  Location: Lower abdomen  Quality: Sharp  Severity: Moderate  Modifying Factors: Nothing makes it better, Walking makes it worse. Associated Symptoms: knotting pain    PCP: Beverly Gillis MD     Past History         Past Medical History:  No past medical history on file. Past Surgical History:  No past surgical history on file. Family History:  No family history on file. Reviewed and non-contributory    Social History:  Social History     Tobacco Use    Smoking status: Not on file    Smokeless tobacco: Not on file   Substance Use Topics    Alcohol use: Not on file    Drug use: Not on file       Medications: Allergies:  No Known Allergies    Review of Systems      Review of Systems   Constitutional: Negative for chills and fever. HENT: Negative for congestion, rhinorrhea and sore throat. Eyes: Negative for pain and visual disturbance. Respiratory: Negative for cough, shortness of breath and wheezing. Cardiovascular: Negative for chest pain and palpitations. Gastrointestinal: Positive for abdominal pain. Negative for diarrhea and vomiting. Genitourinary: Negative for dysuria, flank pain, frequency and urgency.    Musculoskeletal: Negative for arthralgias and myalgias. Skin: Negative for rash and wound. Neurological: Negative for speech difficulty, weakness, light-headedness and headaches. Psychiatric/Behavioral: Negative for agitation and confusion. All other systems reviewed and are negative. Physical Exam     Vitals:    04/29/22 1235   BP: 111/63   Pulse: 73   Resp: 18   Temp: 98 °F (36.7 °C)   SpO2: 100%   Height: 5' 7\" (1.702 m)       Physical Exam  Vitals and nursing note reviewed. Constitutional:       General: She is not in acute distress. Appearance: Normal appearance. She is normal weight. She is not ill-appearing. HENT:      Head: Normocephalic and atraumatic. Nose: Nose normal. No rhinorrhea. Mouth/Throat:      Mouth: Mucous membranes are moist.      Pharynx: No oropharyngeal exudate or posterior oropharyngeal erythema. Eyes:      Extraocular Movements: Extraocular movements intact. Conjunctiva/sclera: Conjunctivae normal.      Pupils: Pupils are equal, round, and reactive to light. Cardiovascular:      Rate and Rhythm: Normal rate and regular rhythm. Heart sounds: No murmur heard. No friction rub. No gallop. Pulmonary:      Effort: Pulmonary effort is normal. No respiratory distress. Breath sounds: Normal breath sounds. No wheezing, rhonchi or rales. Abdominal:      General: Bowel sounds are normal.      Palpations: Abdomen is soft. Tenderness: There is abdominal tenderness in the right lower quadrant and suprapubic area. There is guarding. There is no rebound. Positive signs include McBurney's sign. Negative signs include Amaro's sign and Rovsing's sign. Musculoskeletal:         General: No swelling, tenderness or deformity. Normal range of motion. Cervical back: Normal range of motion and neck supple. No rigidity. Lymphadenopathy:      Cervical: No cervical adenopathy. Skin:     General: Skin is warm and dry. Findings: No rash.    Neurological:      General: No focal deficit present. Mental Status: She is alert and oriented to person, place, and time. Psychiatric:         Mood and Affect: Mood normal.         Behavior: Behavior normal.         Diagnostic Study Results     Labs -  Recent Results (from the past 12 hour(s))   URINALYSIS W/ RFLX MICROSCOPIC    Collection Time: 04/29/22 12:30 PM   Result Value Ref Range    Color YELLOW      Appearance CLEAR      Specific gravity 1.027 1.005 - 1.030      pH (UA) 5.5 5.0 - 8.0      Protein Negative NEG mg/dL    Glucose 500 (A) NEG mg/dL    Ketone TRACE (A) NEG mg/dL    Bilirubin Negative NEG      Blood Negative NEG      Urobilinogen 0.2 0.2 - 1.0 EU/dL    Nitrites Negative NEG      Leukocyte Esterase Negative NEG     HCG URINE, QL    Collection Time: 04/29/22 12:30 PM   Result Value Ref Range    HCG urine, QL Negative NEG     CBC WITH AUTOMATED DIFF    Collection Time: 04/29/22  1:16 PM   Result Value Ref Range    WBC 7.1 4.6 - 13.2 K/uL    RBC 4.37 4.20 - 5.30 M/uL    HGB 12.9 12.0 - 16.0 g/dL    HCT 39.9 35.0 - 45.0 %    MCV 91.3 78.0 - 100.0 FL    MCH 29.5 24.0 - 34.0 PG    MCHC 32.3 31.0 - 37.0 g/dL    RDW 13.1 11.6 - 14.5 %    PLATELET 967 842 - 838 K/uL    MPV 11.5 9.2 - 11.8 FL    NRBC 0.0 0  WBC    ABSOLUTE NRBC 0.00 0.00 - 0.01 K/uL    NEUTROPHILS 56 40 - 73 %    LYMPHOCYTES 33 21 - 52 %    MONOCYTES 9 3 - 10 %    EOSINOPHILS 2 0 - 5 %    BASOPHILS 1 0 - 2 %    IMMATURE GRANULOCYTES 0 0.0 - 0.5 %    ABS. NEUTROPHILS 4.0 1.8 - 8.0 K/UL    ABS. LYMPHOCYTES 2.4 0.9 - 3.6 K/UL    ABS. MONOCYTES 0.6 0.05 - 1.2 K/UL    ABS. EOSINOPHILS 0.1 0.0 - 0.4 K/UL    ABS. BASOPHILS 0.0 0.0 - 0.1 K/UL    ABS. IMM.  GRANS. 0.0 0.00 - 0.04 K/UL    DF AUTOMATED     METABOLIC PANEL, COMPREHENSIVE    Collection Time: 04/29/22  1:16 PM   Result Value Ref Range    Sodium 139 136 - 145 mmol/L    Potassium 4.3 3.5 - 5.5 mmol/L    Chloride 103 100 - 111 mmol/L    CO2 30 21 - 32 mmol/L    Anion gap 6 3.0 - 18 mmol/L    Glucose 85 74 - 99 mg/dL BUN 15 7.0 - 18 MG/DL    Creatinine 0.84 0.6 - 1.3 MG/DL    BUN/Creatinine ratio 18 12 - 20      GFR est AA >60 >60 ml/min/1.73m2    GFR est non-AA >60 >60 ml/min/1.73m2    Calcium 9.4 8.5 - 10.1 MG/DL    Bilirubin, total 0.3 0.2 - 1.0 MG/DL    ALT (SGPT) 21 13 - 56 U/L    AST (SGOT) 16 10 - 38 U/L    Alk. phosphatase 46 45 - 117 U/L    Protein, total 7.3 6.4 - 8.2 g/dL    Albumin 3.8 3.4 - 5.0 g/dL    Globulin 3.5 2.0 - 4.0 g/dL    A-G Ratio 1.1 0.8 - 1.7     LIPASE    Collection Time: 04/29/22  1:16 PM   Result Value Ref Range    Lipase 188 73 - 393 U/L       Radiologic Studies -   US TRANSVAGINAL W DOPPLER   Final Result         1. Findings suggest a 3.7 cm hemorrhagic cyst on the right. No evidence of   torsion or other acute abnormality. CT ABD PELV W CONT   Final Result      No acute intra-abdominal abnormality. Right ovarian 3.3 cm cyst.      Trace pelvic free fluid. CT Results  (Last 48 hours)               04/29/22 1640  CT ABD PELV W CONT Final result    Impression:      No acute intra-abdominal abnormality. Right ovarian 3.3 cm cyst.       Trace pelvic free fluid. Narrative:  EXAM: CT of the abdomen and pelvis       INDICATION: Pain. COMPARISON: None. TECHNIQUE: Axial CT imaging of the abdomen and pelvis was performed intravenous   contrast. Multiplanar reformats were generated. One or more dose reduction techniques were used on this CT: automated exposure   control, adjustment of the mAs and/or kVp according to patient size, and   iterative reconstruction techniques. The specific techniques used on this CT   exam have been documented in the patient's electronic medical record.   Digital   Imaging and Communications in Medicine (DICOM) format image data are available   to nonaffiliated external healthcare facilities or entities on a secure, media   free, reciprocally searchable basis with patient authorization for at least a   12-month period after this study. _______________       FINDINGS:       LOWER CHEST: Unremarkable. LIVER, BILIARY: Liver is normal. No biliary dilation. Gallbladder is   unremarkable. PANCREAS: Normal.       SPLEEN: Normal.       ADRENALS: Normal.       KIDNEYS/URETERS/BLADDER: Normal.       PELVIC ORGANS: Right ovarian 3.3 cm cyst.       VASCULATURE: Unremarkable       LYMPH NODES: No enlarged lymph nodes. GASTROINTESTINAL TRACT: No bowel dilation or wall thickening. Normal appendix. BONES: No acute or aggressive osseous abnormalities identified. OTHER: Trace pelvic ascites. .       _______________               CXR Results  (Last 48 hours)    None          Medications given in the ED-  Medications   dicyclomine (BENTYL) capsule 20 mg (20 mg Oral Given 4/29/22 1345)   ondansetron (ZOFRAN) injection 4 mg (4 mg IntraVENous Given 4/29/22 1345)   iopamidoL (ISOVUE 300) 61 % contrast injection 100 mL (100 mL IntraVENous Given 4/29/22 1635)   HYDROcodone-acetaminophen (NORCO) 5-325 mg per tablet 1 Tablet (1 Tablet Oral Given 4/29/22 1804)       Procedures     Procedures    ED Course     I am the first provider for this patient. I reviewed the vital signs, available nursing notes, past medical history, past surgical history, family history and social history. Records Reviewed: Nursing Notes    Cardiac Monitor:  Rate: 73 bpm  Rhythm: sinus rhythm    Pulse Oximetry Analysis - 100% on RA    12:59 PM Initial assessment performed. The patients presenting problems have been discussed, and they are in agreement with the care plan formulated and outlined with them. I have encouraged them to ask questions as they arise throughout their visit.     ED Course as of 04/29/22 1838 Fri Apr 29, 2022   1459 Pt , will get CT [JM]   1654 CT ABD PELV W CONT [JM]   2812 PELVIC ORGANS: Right ovarian 3.3 cm cyst. Will assess for torsion with US.   [JM]   1069 Pt has moderate pain, declines IV medication, will give 1 norco. [JM]      ED Course User Index  [JM] Kirsty Duran MD           Medical Decision Making     Provider Notes (Medical Decision Making):   DDX: Appendicitis, ovarian cyst, ovarian torsion, UTI, gastroenteritis    Discussion:  33 y.o. female with acute right lower quadrant abdominal pain. Patient has a right hemorrhagic ovarian cyst which is slightly greater than 3 cm in size. Ultrasound was obtained and did not demonstrate any signs of torsion. Patient may follow-up with her primary care doctor on post on Monday to obtain an OB/GYN consult. Patient cautioned to return for worsening symptoms as this still could be a torsion detorsion syndrome. Patient understands and agrees with this plan. Diagnosis and Disposition     DISCHARGE NOTE:  6:40 PM   Dmitriy Jones's  results have been reviewed with her. She has been counseled regarding her diagnosis, treatment, and plan. She verbally conveys understanding and agreement of the signs, symptoms, diagnosis, treatment and prognosis and additionally agrees to follow up as discussed. She also agrees with the care-plan and conveys that all of her questions have been answered. I have also provided discharge instructions for her that include: educational information regarding their diagnosis and treatment, and list of reasons why they would want to return to the ED prior to their follow-up appointment, should her condition change. She has been provided with education for proper emergency department utilization. CLINICAL IMPRESSION:    1. Hemorrhagic cyst of right ovary        PLAN:  1. D/C Home  2. Current Discharge Medication List      START taking these medications    Details   ibuprofen (MOTRIN) 800 mg tablet Take 1 Tablet by mouth every eight (8) hours as needed for Pain for up to 10 days. Qty: 30 Tablet, Refills: 0  Start date: 4/29/2022, End date: 5/9/2022           3.    Follow-up Information     Follow up With Specialties Details Why Contact Info    [facility] [place]   As soon as possible, For follow up from Emergency Department visit. Discussed a referral to OB/GYN with your primary care doctor. [id number]  [phone]    [facility] EMERGENCY DEPT Emergency Medicine  As needed; If symptoms worsen [address] [place] [id number]  [address]     Please note that this dictation was completed with Vocollect, the computer voice recognition software. Quite often unanticipated grammatical, syntax, homophones, and other interpretive errors are inadvertently transcribed by the computer software. Please disregard these errors. Please excuse any errors that have escaped final proofreading.     Maggi Pineda MD No

## 2022-06-02 ENCOUNTER — NON-APPOINTMENT (OUTPATIENT)
Age: 27
End: 2022-06-02

## 2022-06-15 ENCOUNTER — NON-APPOINTMENT (OUTPATIENT)
Age: 27
End: 2022-06-15

## 2022-06-17 ENCOUNTER — NON-APPOINTMENT (OUTPATIENT)
Age: 27
End: 2022-06-17

## 2022-06-21 ENCOUNTER — NON-APPOINTMENT (OUTPATIENT)
Age: 27
End: 2022-06-21

## 2022-07-07 ENCOUNTER — FORM ENCOUNTER (OUTPATIENT)
Age: 27
End: 2022-07-07

## 2022-07-07 ENCOUNTER — NON-APPOINTMENT (OUTPATIENT)
Age: 27
End: 2022-07-07

## 2022-07-08 ENCOUNTER — LABORATORY RESULT (OUTPATIENT)
Age: 27
End: 2022-07-08

## 2022-07-08 ENCOUNTER — APPOINTMENT (OUTPATIENT)
Dept: INFECTIOUS DISEASE | Facility: CLINIC | Age: 27
End: 2022-07-08

## 2022-07-08 VITALS
BODY MASS INDEX: 22.75 KG/M2 | DIASTOLIC BLOOD PRESSURE: 76 MMHG | WEIGHT: 168 LBS | RESPIRATION RATE: 16 BRPM | SYSTOLIC BLOOD PRESSURE: 124 MMHG | HEIGHT: 72 IN | HEART RATE: 72 BPM | OXYGEN SATURATION: 97 % | TEMPERATURE: 97.6 F

## 2022-07-08 DIAGNOSIS — B20 HUMAN IMMUNODEFICIENCY VIRUS [HIV] DISEASE: ICD-10-CM

## 2022-07-08 DIAGNOSIS — Z92.89 PERSONAL HISTORY OF OTHER MEDICAL TREATMENT: ICD-10-CM

## 2022-07-08 PROCEDURE — 99213 OFFICE O/P EST LOW 20 MIN: CPT

## 2022-07-08 RX ORDER — ADHESIVE TAPE 3"X 2.3 YD
50 MCG TAPE, NON-MEDICATED TOPICAL
Qty: 30 | Refills: 5 | Status: ACTIVE | COMMUNITY
Start: 2022-07-08 | End: 1900-01-01
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
## 2022-07-11 ENCOUNTER — NON-APPOINTMENT (OUTPATIENT)
Age: 27
End: 2022-07-11

## 2022-07-11 LAB
25(OH)D3 SERPL-MCNC: 18.6 NG/ML
ALBUMIN SERPL ELPH-MCNC: 4.1 G/DL
ALP BLD-CCNC: 74 U/L
ALT SERPL-CCNC: 22 U/L
ANION GAP SERPL CALC-SCNC: 11 MMOL/L
APPEARANCE: CLEAR
AST SERPL-CCNC: 17 U/L
BACTERIA: NEGATIVE
BASOPHILS # BLD AUTO: 0.02 K/UL
BASOPHILS NFR BLD AUTO: 0.5 %
BILIRUB SERPL-MCNC: 0.8 MG/DL
BILIRUBIN URINE: NEGATIVE
BLOOD URINE: NEGATIVE
BUN SERPL-MCNC: 10 MG/DL
C TRACH RRNA SPEC QL NAA+PROBE: NOT DETECTED
C TRACH RRNA SPEC QL NAA+PROBE: NOT DETECTED
CALCIUM SERPL-MCNC: 9.5 MG/DL
CD3 CELLS # BLD: 1343 /UL
CD3 CELLS NFR BLD: 83 %
CD3+CD4+ CELLS # BLD: 563 /UL
CD3+CD4+ CELLS NFR BLD: 35 %
CD3+CD4+ CELLS/CD3+CD8+ CLL SPEC: 0.79 RATIO
CD3+CD8+ CELLS # SPEC: 715 /UL
CD3+CD8+ CELLS NFR BLD: 44 %
CHLORIDE SERPL-SCNC: 103 MMOL/L
CHOLEST SERPL-MCNC: 173 MG/DL
CO2 SERPL-SCNC: 26 MMOL/L
COLOR: NORMAL
CREAT SERPL-MCNC: 0.89 MG/DL
EGFR: 122 ML/MIN/1.73M2
EOSINOPHIL # BLD AUTO: 0.08 K/UL
EOSINOPHIL NFR BLD AUTO: 2.1 %
ESTIMATED AVERAGE GLUCOSE: 88 MG/DL
GLUCOSE QUALITATIVE U: NEGATIVE
GLUCOSE SERPL-MCNC: 70 MG/DL
HBA1C MFR BLD HPLC: 4.7 %
HCT VFR BLD CALC: 42 %
HDLC SERPL-MCNC: 52 MG/DL
HGB BLD-MCNC: 13.4 G/DL
HIV1 RNA # SERPL NAA+PROBE: NORMAL
HIV1 RNA # SERPL NAA+PROBE: NORMAL COPIES/ML
HYALINE CASTS: 1 /LPF
IMM GRANULOCYTES NFR BLD AUTO: 0.3 %
KETONES URINE: NEGATIVE
LDLC SERPL CALC-MCNC: 100 MG/DL
LEUKOCYTE ESTERASE URINE: ABNORMAL
LYMPHOCYTES # BLD AUTO: 1.72 K/UL
LYMPHOCYTES NFR BLD AUTO: 45.9 %
MAN DIFF?: NORMAL
MCHC RBC-ENTMCNC: 31.2 PG
MCHC RBC-ENTMCNC: 31.9 GM/DL
MCV RBC AUTO: 97.7 FL
MICROSCOPIC-UA: NORMAL
MONOCYTES # BLD AUTO: 0.38 K/UL
MONOCYTES NFR BLD AUTO: 10.1 %
N GONORRHOEA RRNA SPEC QL NAA+PROBE: DETECTED
N GONORRHOEA RRNA SPEC QL NAA+PROBE: NOT DETECTED
NEUTROPHILS # BLD AUTO: 1.54 K/UL
NEUTROPHILS NFR BLD AUTO: 41.1 %
NITRITE URINE: NEGATIVE
NONHDLC SERPL-MCNC: 121 MG/DL
PH URINE: 6
PLATELET # BLD AUTO: 325 K/UL
POTASSIUM SERPL-SCNC: 4.2 MMOL/L
PROT SERPL-MCNC: 7.2 G/DL
PROTEIN URINE: NEGATIVE
PSA SERPL-MCNC: 0.49 NG/ML
RBC # BLD: 4.3 M/UL
RBC # FLD: 13.2 %
RED BLOOD CELLS URINE: 0 /HPF
SODIUM SERPL-SCNC: 140 MMOL/L
SOURCE AMPLIFICATION: NORMAL
SOURCE ORAL: NORMAL
SPECIFIC GRAVITY URINE: 1.01
SQUAMOUS EPITHELIAL CELLS: 0 /HPF
T PALLIDUM AB SER QL IA: POSITIVE
TRIGL SERPL-MCNC: 103 MG/DL
UROBILINOGEN URINE: NORMAL
VIRAL LOAD INTERP: NORMAL
VIRAL LOAD LOG: NORMAL LG COP/ML
WBC # FLD AUTO: 3.75 K/UL
WHITE BLOOD CELLS URINE: 2 /HPF

## 2022-07-13 ENCOUNTER — APPOINTMENT (OUTPATIENT)
Dept: INFECTIOUS DISEASE | Facility: CLINIC | Age: 27
End: 2022-07-13

## 2022-08-29 ENCOUNTER — RX RENEWAL (OUTPATIENT)
Age: 27
End: 2022-08-29

## 2022-12-19 ENCOUNTER — NON-APPOINTMENT (OUTPATIENT)
Age: 27
End: 2022-12-19

## 2022-12-22 ENCOUNTER — NON-APPOINTMENT (OUTPATIENT)
Age: 27
End: 2022-12-22

## 2023-01-05 ENCOUNTER — APPOINTMENT (OUTPATIENT)
Dept: INFECTIOUS DISEASE | Facility: CLINIC | Age: 28
End: 2023-01-05

## 2023-02-17 ENCOUNTER — NON-APPOINTMENT (OUTPATIENT)
Age: 28
End: 2023-02-17

## 2023-02-23 ENCOUNTER — NON-APPOINTMENT (OUTPATIENT)
Age: 28
End: 2023-02-23

## 2023-02-27 ENCOUNTER — RX RENEWAL (OUTPATIENT)
Age: 28
End: 2023-02-27

## 2023-03-29 ENCOUNTER — RX RENEWAL (OUTPATIENT)
Age: 28
End: 2023-03-29

## 2023-07-14 ENCOUNTER — NON-APPOINTMENT (OUTPATIENT)
Age: 28
End: 2023-07-14

## 2023-07-20 ENCOUNTER — NON-APPOINTMENT (OUTPATIENT)
Age: 28
End: 2023-07-20

## 2023-07-25 ENCOUNTER — RX RENEWAL (OUTPATIENT)
Age: 28
End: 2023-07-25

## 2023-07-27 ENCOUNTER — NON-APPOINTMENT (OUTPATIENT)
Age: 28
End: 2023-07-27

## 2023-08-10 NOTE — ED BEHAVIORAL HEALTH ASSESSMENT NOTE - NS ED BHA MED ROS GENITOURINARY
follow up with your primary care doctor, your cardiologist as well as neurology    your CT results are pending  call in a few hours here for the results, you can take your blood thinner after results indicate no bleeding    Hypertension    Hypertension, commonly called high blood pressure, is when the force of blood pumping through your arteries is too strong. Hypertension forces your heart to work harder to pump blood. Your arteries may become narrow or stiff. Having untreated or uncontrolled hypertension for a long period of time can cause heart attack, stroke, kidney disease, and other problems. If started on a medication, take exactly as prescribed by your health care professional. Maintain a healthy lifestyle and follow up with your primary care physician.    SEEK IMMEDIATE MEDICAL CARE IF YOU HAVE ANY OF THE FOLLOWING SYMPTOMS: severe headache, confusion, chest pain, abdominal pain, vomiting, or shortness of breath. No complaints

## 2023-08-15 ENCOUNTER — NON-APPOINTMENT (OUTPATIENT)
Age: 28
End: 2023-08-15

## 2023-09-18 ENCOUNTER — NON-APPOINTMENT (OUTPATIENT)
Age: 28
End: 2023-09-18

## 2023-10-02 ENCOUNTER — RX RENEWAL (OUTPATIENT)
Age: 28
End: 2023-10-02

## 2023-10-02 RX ORDER — BICTEGRAVIR SODIUM, EMTRICITABINE, AND TENOFOVIR ALAFENAMIDE FUMARATE 50; 200; 25 MG/1; MG/1; MG/1
50-200-25 TABLET ORAL
Qty: 30 | Refills: 3 | Status: ACTIVE | COMMUNITY
Start: 2021-09-16 | End: 1900-01-01

## 2023-11-07 ENCOUNTER — NON-APPOINTMENT (OUTPATIENT)
Age: 28
End: 2023-11-07

## 2023-12-26 ENCOUNTER — NON-APPOINTMENT (OUTPATIENT)
Age: 28
End: 2023-12-26

## 2024-01-30 ENCOUNTER — NON-APPOINTMENT (OUTPATIENT)
Age: 29
End: 2024-01-30

## 2024-02-11 NOTE — DISCHARGE NOTE NURSING/CASE MANAGEMENT/SOCIAL WORK - NSTRANSFERBELONGINGSRESP_GEN_A_NUR
HISTORY AND PHYSICAL    Nabil Wiggins  32 year old male  [id number]    Date: 2/11/2024    Chief complaint: acute pancreatitis    Primary Care Physician: Ash Alfaro MD    History of the Present Illness: Nabil Wiggins is a 32 year old male known history of recurrent alcoholic pancreatitis, last admission in June last year for necrotizing pancreatitis, multiple fluid collections noted at that time, patient has been actively drinking, last drink was unwitnessed day. Since last night he started having epigastric pain gradually worsening, it radiates to the back, with nausea, has not vomited yet.  Pain is 9/10 at present, required several doses of Dilaudid in the ED. Currently no withdrawal signs.      Past Medical and Surgical History:  History reviewed. No pertinent past medical history.  Past Surgical History:   Procedure Laterality Date    Fracture surgery Left     foot       Home Medications:  (Not in a hospital admission)      Family history:  Family History   Problem Relation Age of Onset    Patient is unaware of any medical problems Mother     Diabetes Father     Congestive Heart Failure Father     Patient is unaware of any medical problems Sister     Patient is unaware of any medical problems Sister     Patient is unaware of any medical problems Brother        Social history:  Social History     Tobacco Use    Smoking status: Never    Smokeless tobacco: Never   Substance Use Topics    Alcohol use: Yes     Alcohol/week: 6.0 standard drinks of alcohol     Types: 6 Standard drinks or equivalent per week     Comment: social       Review of systems:  As per H&P    Vitals:  Vitals:    02/11/24 1700   BP: (!) 155/81   Pulse: 81   Resp: 20   Temp:        Physical Examination:      Constitutional:  Well developed, well nourished, in severe pain  Eyes:  Pupils equal, round, reactive to light, conjunctivae normal.  HENT:  Atraumatic, normocephalic,  external ears normal, nose normal, oropharynx moist, no pharyngeal  exudates..  Neck: Normal range of motion, no tenderness, supple.  Respiratory:  No respiratory distress, normal breath sounds, no rales, no wheezing.  Cardiovascular:  Normal rate, normal rhythm, no murmurs, no gallops, no rubs.  GI:  Soft, nondistended, epigastric tenderness to palpation  Neurologic:  Alert & oriented x 3. CN II-XII normal to specific exam, normal motor function, normal sensory function, no focal deficits noted.  Extremities: No clubbing, cyanosis, nor edema.  Psychiatric:  Speech and behavior appropriate.    Laboratory data :  Recent Labs   Lab 02/11/24  1526 02/11/24  1520   WBC  --  15.6*   HCT  --  48.2   HGB  --  17.6*   PLT  --  255   SODIUM  --  138   POTASSIUM  --  3.5   CHLORIDE  --  103   CO2  --  22   CALCIUM  --  9.5   GLUCOSE  --  155*   BUN  --  11   CREATININE 1.00 0.91   AST  --  50*   GPT  --  58   ALKPT  --  93   BILIRUBIN  --  1.6*   ALBUMIN  --  4.2   LIPA  --  1,977*       Radiographic Data Reviewed Include:  CT abdomen pending      Problem list:  Patient Active Problem List   Diagnosis    Hyperopia of both eyes with astigmatism    Acute pancreatitis, unspecified complication status, unspecified pancreatitis type    Pleural effusion    Acute kidney injury (CMD)    Hypomagnesemia    Hypokalemia    Hyperkalemia    Sinus tachycardia    Transaminitis    Pancreatitis, unspecified pancreatitis type    Acute pancreatitis without infection or necrosis    Alcohol use disorder    Generalized anxiety disorder    Alcohol use disorder, severe, in early remission (CMD)    Current moderate episode of major depressive disorder (CMD)    Alcohol-induced acute pancreatitis, unspecified complication status       Assessment  & Plan:    Acute alcoholic pancreatitis, recurrent  Hypokalemia    -CT abdomen this time shows acute pancreatitis but no fluid collection, [id number]  -NPO with ice chips and meds  -IVF, replete K  -antiemetics, dilaudid PRN  -CIWA, had severe withdrawal during episode last  admission, requiring intubation  Monitor CBC/CMP        Generalized anxiety Disorder on BuSpar    Code status-patient wants to be Full Code    I discussed the case with the Emergency Medicine physician and reviewed the patient's chart in Epic as available.       Sharda Walker MD  2/11/2024  5:55 PM      Contact the hospitalist caring for the patient until 7:00pm. From 7:00pm to 7:00 am contact the hospitalist on call.         jacket, watch, phone, green bag, money with wallet/yes

## 2024-11-08 ENCOUNTER — NON-APPOINTMENT (OUTPATIENT)
Age: 29
End: 2024-11-08

## 2024-12-05 NOTE — ED PROVIDER NOTE - CPE EDP ENMT NORM
normal...
I'm still throwing up - patient  Patietn was here yesterday, transfer from [facility]